# Patient Record
Sex: MALE | Race: WHITE
[De-identification: names, ages, dates, MRNs, and addresses within clinical notes are randomized per-mention and may not be internally consistent; named-entity substitution may affect disease eponyms.]

---

## 2017-03-20 ENCOUNTER — HOSPITAL ENCOUNTER (OUTPATIENT)
Dept: HOSPITAL 8 - CVU | Age: 72
Discharge: HOME | End: 2017-03-20
Attending: INTERNAL MEDICINE
Payer: MEDICARE

## 2017-03-20 DIAGNOSIS — I34.0: Primary | ICD-10-CM

## 2017-03-20 DIAGNOSIS — I44.7: ICD-10-CM

## 2017-03-20 DIAGNOSIS — I51.7: ICD-10-CM

## 2017-03-20 DIAGNOSIS — I42.0: ICD-10-CM

## 2017-03-20 PROCEDURE — 93306 TTE W/DOPPLER COMPLETE: CPT

## 2018-08-18 ENCOUNTER — APPOINTMENT (OUTPATIENT)
Dept: RADIOLOGY | Facility: MEDICAL CENTER | Age: 73
DRG: 510 | End: 2018-08-18
Attending: ORTHOPAEDIC SURGERY
Payer: MEDICARE

## 2018-08-18 ENCOUNTER — APPOINTMENT (OUTPATIENT)
Dept: RADIOLOGY | Facility: MEDICAL CENTER | Age: 73
DRG: 510 | End: 2018-08-18
Attending: EMERGENCY MEDICINE
Payer: MEDICARE

## 2018-08-18 ENCOUNTER — APPOINTMENT (OUTPATIENT)
Dept: RADIOLOGY | Facility: MEDICAL CENTER | Age: 73
DRG: 510 | End: 2018-08-18
Attending: PHYSICIAN ASSISTANT
Payer: MEDICARE

## 2018-08-18 ENCOUNTER — HOSPITAL ENCOUNTER (INPATIENT)
Facility: MEDICAL CENTER | Age: 73
LOS: 4 days | DRG: 510 | End: 2018-08-22
Attending: EMERGENCY MEDICINE | Admitting: SURGERY
Payer: MEDICARE

## 2018-08-18 ENCOUNTER — RESOLUTE PROFESSIONAL BILLING HOSPITAL PROF FEE (OUTPATIENT)
Dept: HOSPITALIST | Facility: MEDICAL CENTER | Age: 73
End: 2018-08-18
Payer: MEDICARE

## 2018-08-18 ENCOUNTER — HOSPITAL ENCOUNTER (OUTPATIENT)
Dept: RADIOLOGY | Facility: MEDICAL CENTER | Age: 73
End: 2018-08-18

## 2018-08-18 DIAGNOSIS — S12.9XXA COMPRESSION FRACTURE OF CERVICAL SPINE, INITIAL ENCOUNTER: ICD-10-CM

## 2018-08-18 DIAGNOSIS — S22.000A CLOSED COMPRESSION FRACTURE OF THORACIC VERTEBRA, INITIAL ENCOUNTER (HCC): ICD-10-CM

## 2018-08-18 DIAGNOSIS — S62.102A CLOSED FRACTURE OF LEFT WRIST, INITIAL ENCOUNTER: ICD-10-CM

## 2018-08-18 DIAGNOSIS — S09.90XA CLOSED HEAD INJURY, INITIAL ENCOUNTER: ICD-10-CM

## 2018-08-18 PROBLEM — T07.XXXA MULTIPLE TRAUMA: Status: ACTIVE | Noted: 2018-08-18

## 2018-08-18 PROBLEM — N40.0 BPH (BENIGN PROSTATIC HYPERPLASIA): Status: ACTIVE | Noted: 2018-08-18

## 2018-08-18 PROBLEM — S52.90XA RADIAL FRACTURE: Status: ACTIVE | Noted: 2018-08-18

## 2018-08-18 PROBLEM — I77.74 DISSECTION, VERTEBRAL ARTERY (HCC): Status: ACTIVE | Noted: 2018-08-18

## 2018-08-18 PROBLEM — S22.49XA MULTIPLE RIB FRACTURES: Status: ACTIVE | Noted: 2018-08-18

## 2018-08-18 PROBLEM — E11.9 TYPE 2 DIABETES MELLITUS (HCC): Status: ACTIVE | Noted: 2018-08-18

## 2018-08-18 PROBLEM — I10 ESSENTIAL HYPERTENSION: Status: ACTIVE | Noted: 2018-08-18

## 2018-08-18 LAB
ABO GROUP BLD: NORMAL
ABO GROUP BLD: NORMAL
ALBUMIN SERPL BCP-MCNC: 4.1 G/DL (ref 3.2–4.9)
ALBUMIN/GLOB SERPL: 1.5 G/DL
ALP SERPL-CCNC: 47 U/L (ref 30–99)
ALT SERPL-CCNC: 25 U/L (ref 2–50)
ANION GAP SERPL CALC-SCNC: 8 MMOL/L (ref 0–11.9)
APTT PPP: 32.6 SEC (ref 24.7–36)
AST SERPL-CCNC: 34 U/L (ref 12–45)
BILIRUB SERPL-MCNC: 0.9 MG/DL (ref 0.1–1.5)
BLD GP AB SCN SERPL QL: NORMAL
BUN SERPL-MCNC: 16 MG/DL (ref 8–22)
CALCIUM SERPL-MCNC: 9.1 MG/DL (ref 8.5–10.5)
CFT BLD TEG: 4.3 MIN (ref 5–10)
CHLORIDE SERPL-SCNC: 105 MMOL/L (ref 96–112)
CLOT ANGLE BLD TEG: 62.7 DEGREES (ref 53–72)
CLOT LYSIS 30M P MA LENFR BLD TEG: 0 % (ref 0–8)
CO2 SERPL-SCNC: 25 MMOL/L (ref 20–33)
CREAT SERPL-MCNC: 1.12 MG/DL (ref 0.5–1.4)
CT.EXTRINSIC BLD ROTEM: 2.2 MIN (ref 1–3)
ERYTHROCYTE [DISTWIDTH] IN BLOOD BY AUTOMATED COUNT: 43.5 FL (ref 35.9–50)
ETHANOL BLD-MCNC: 0.01 G/DL
GLOBULIN SER CALC-MCNC: 2.7 G/DL (ref 1.9–3.5)
GLUCOSE SERPL-MCNC: 125 MG/DL (ref 65–99)
HCT VFR BLD AUTO: 40 % (ref 42–52)
HGB BLD-MCNC: 13.6 G/DL (ref 14–18)
INR PPP: 1.11 (ref 0.87–1.13)
MCF BLD TEG: 65.6 MM (ref 50–70)
MCH RBC QN AUTO: 29.4 PG (ref 27–33)
MCHC RBC AUTO-ENTMCNC: 34 G/DL (ref 33.7–35.3)
MCV RBC AUTO: 86.6 FL (ref 81.4–97.8)
PA AA BLD-ACNC: 91.4 %
PA ADP BLD-ACNC: 89.9 %
PLATELET # BLD AUTO: 222 K/UL (ref 164–446)
PMV BLD AUTO: 12.2 FL (ref 9–12.9)
POTASSIUM SERPL-SCNC: 4.1 MMOL/L (ref 3.6–5.5)
PROT SERPL-MCNC: 6.8 G/DL (ref 6–8.2)
PROTHROMBIN TIME: 14 SEC (ref 12–14.6)
RBC # BLD AUTO: 4.62 M/UL (ref 4.7–6.1)
RH BLD: NORMAL
RH BLD: NORMAL
SODIUM SERPL-SCNC: 138 MMOL/L (ref 135–145)
TEG ALGORITHM TGALG: ABNORMAL
WBC # BLD AUTO: 18.9 K/UL (ref 4.8–10.8)

## 2018-08-18 PROCEDURE — 86850 RBC ANTIBODY SCREEN: CPT

## 2018-08-18 PROCEDURE — 85384 FIBRINOGEN ACTIVITY: CPT

## 2018-08-18 PROCEDURE — 70498 CT ANGIOGRAPHY NECK: CPT

## 2018-08-18 PROCEDURE — 70450 CT HEAD/BRAIN W/O DYE: CPT

## 2018-08-18 PROCEDURE — 86900 BLOOD TYPING SEROLOGIC ABO: CPT

## 2018-08-18 PROCEDURE — 700117 HCHG RX CONTRAST REV CODE 255: Performed by: EMERGENCY MEDICINE

## 2018-08-18 PROCEDURE — 73100 X-RAY EXAM OF WRIST: CPT | Mod: LT

## 2018-08-18 PROCEDURE — 700111 HCHG RX REV CODE 636 W/ 250 OVERRIDE (IP): Performed by: NURSE PRACTITIONER

## 2018-08-18 PROCEDURE — C1713 ANCHOR/SCREW BN/BN,TIS/BN: HCPCS | Performed by: ORTHOPAEDIC SURGERY

## 2018-08-18 PROCEDURE — 160039 HCHG SURGERY MINUTES - EA ADDL 1 MIN LEVEL 3: Performed by: ORTHOPAEDIC SURGERY

## 2018-08-18 PROCEDURE — 99291 CRITICAL CARE FIRST HOUR: CPT

## 2018-08-18 PROCEDURE — A6222 GAUZE <=16 IN NO W/SAL W/O B: HCPCS | Performed by: ORTHOPAEDIC SURGERY

## 2018-08-18 PROCEDURE — 80307 DRUG TEST PRSMV CHEM ANLYZR: CPT

## 2018-08-18 PROCEDURE — 700111 HCHG RX REV CODE 636 W/ 250 OVERRIDE (IP)

## 2018-08-18 PROCEDURE — G0390 TRAUMA RESPONS W/HOSP CRITI: HCPCS

## 2018-08-18 PROCEDURE — 770022 HCHG ROOM/CARE - ICU (200)

## 2018-08-18 PROCEDURE — 500881 HCHG PACK, EXTREMITY: Performed by: ORTHOPAEDIC SURGERY

## 2018-08-18 PROCEDURE — 160002 HCHG RECOVERY MINUTES (STAT): Performed by: ORTHOPAEDIC SURGERY

## 2018-08-18 PROCEDURE — 85576 BLOOD PLATELET AGGREGATION: CPT

## 2018-08-18 PROCEDURE — 72131 CT LUMBAR SPINE W/O DYE: CPT

## 2018-08-18 PROCEDURE — 85610 PROTHROMBIN TIME: CPT

## 2018-08-18 PROCEDURE — 700105 HCHG RX REV CODE 258: Performed by: PHYSICIAN ASSISTANT

## 2018-08-18 PROCEDURE — 501838 HCHG SUTURE GENERAL: Performed by: ORTHOPAEDIC SURGERY

## 2018-08-18 PROCEDURE — 85347 COAGULATION TIME ACTIVATED: CPT

## 2018-08-18 PROCEDURE — 700111 HCHG RX REV CODE 636 W/ 250 OVERRIDE (IP): Performed by: ORTHOPAEDIC SURGERY

## 2018-08-18 PROCEDURE — 160028 HCHG SURGERY MINUTES - 1ST 30 MINS LEVEL 3: Performed by: ORTHOPAEDIC SURGERY

## 2018-08-18 PROCEDURE — 86901 BLOOD TYPING SEROLOGIC RH(D): CPT

## 2018-08-18 PROCEDURE — 73090 X-RAY EXAM OF FOREARM: CPT | Mod: LT

## 2018-08-18 PROCEDURE — 700101 HCHG RX REV CODE 250: Mod: JW

## 2018-08-18 PROCEDURE — 160009 HCHG ANES TIME/MIN: Performed by: ORTHOPAEDIC SURGERY

## 2018-08-18 PROCEDURE — 94668 MNPJ CHEST WALL SBSQ: CPT

## 2018-08-18 PROCEDURE — 85027 COMPLETE CBC AUTOMATED: CPT

## 2018-08-18 PROCEDURE — 160048 HCHG OR STATISTICAL LEVEL 1-5: Performed by: ORTHOPAEDIC SURGERY

## 2018-08-18 PROCEDURE — 72125 CT NECK SPINE W/O DYE: CPT

## 2018-08-18 PROCEDURE — 160035 HCHG PACU - 1ST 60 MINS PHASE I: Performed by: ORTHOPAEDIC SURGERY

## 2018-08-18 PROCEDURE — 85730 THROMBOPLASTIN TIME PARTIAL: CPT

## 2018-08-18 PROCEDURE — 0PSJ04Z REPOSITION LEFT RADIUS WITH INTERNAL FIXATION DEVICE, OPEN APPROACH: ICD-10-PCS | Performed by: ORTHOPAEDIC SURGERY

## 2018-08-18 PROCEDURE — 72128 CT CHEST SPINE W/O DYE: CPT

## 2018-08-18 PROCEDURE — 94667 MNPJ CHEST WALL 1ST: CPT

## 2018-08-18 PROCEDURE — 71260 CT THORAX DX C+: CPT

## 2018-08-18 PROCEDURE — 80053 COMPREHEN METABOLIC PANEL: CPT

## 2018-08-18 PROCEDURE — 700105 HCHG RX REV CODE 258: Performed by: NURSE PRACTITIONER

## 2018-08-18 DEVICE — SCREW 2.4MM LCP VA 16MM - (2VADRX5=10): Type: IMPLANTABLE DEVICE | Site: WRIST | Status: FUNCTIONAL

## 2018-08-18 DEVICE — SCREW CRTX 2.4X14MM ST T8 - (3TX3+1TX2=11) (CYC=3): Type: IMPLANTABLE DEVICE | Site: WRIST | Status: FUNCTIONAL

## 2018-08-18 DEVICE — PLATE 2.4 LCP VA VDR 6X3H LT - (2VADR=2) 2-CLMN: Type: IMPLANTABLE DEVICE | Site: WRIST | Status: FUNCTIONAL

## 2018-08-18 DEVICE — SCREW 2.4MM LCP VA 18MM - (2VADRX5=10): Type: IMPLANTABLE DEVICE | Site: WRIST | Status: FUNCTIONAL

## 2018-08-18 RX ORDER — SODIUM CHLORIDE 9 MG/ML
INJECTION, SOLUTION INTRAVENOUS
Status: COMPLETED | OUTPATIENT
Start: 2018-08-18 | End: 2018-08-18

## 2018-08-18 RX ORDER — DOCUSATE SODIUM 100 MG/1
100 CAPSULE, LIQUID FILLED ORAL 2 TIMES DAILY
Status: DISCONTINUED | OUTPATIENT
Start: 2018-08-18 | End: 2018-08-22 | Stop reason: HOSPADM

## 2018-08-18 RX ORDER — POLYETHYLENE GLYCOL 3350 17 G/17G
1 POWDER, FOR SOLUTION ORAL 2 TIMES DAILY
Status: DISCONTINUED | OUTPATIENT
Start: 2018-08-18 | End: 2018-08-22 | Stop reason: HOSPADM

## 2018-08-18 RX ORDER — BISACODYL 10 MG
10 SUPPOSITORY, RECTAL RECTAL
Status: DISCONTINUED | OUTPATIENT
Start: 2018-08-18 | End: 2018-08-22 | Stop reason: HOSPADM

## 2018-08-18 RX ORDER — ONDANSETRON 2 MG/ML
4 INJECTION INTRAMUSCULAR; INTRAVENOUS EVERY 4 HOURS PRN
Status: DISCONTINUED | OUTPATIENT
Start: 2018-08-18 | End: 2018-08-22 | Stop reason: HOSPADM

## 2018-08-18 RX ORDER — SODIUM CHLORIDE 9 MG/ML
INJECTION, SOLUTION INTRAVENOUS CONTINUOUS
Status: DISCONTINUED | OUTPATIENT
Start: 2018-08-18 | End: 2018-08-19

## 2018-08-18 RX ORDER — CEFAZOLIN SODIUM 2 G/100ML
2 INJECTION, SOLUTION INTRAVENOUS EVERY 8 HOURS
Status: COMPLETED | OUTPATIENT
Start: 2018-08-18 | End: 2018-08-19

## 2018-08-18 RX ORDER — ENEMA 19; 7 G/133ML; G/133ML
1 ENEMA RECTAL
Status: DISCONTINUED | OUTPATIENT
Start: 2018-08-18 | End: 2018-08-22 | Stop reason: HOSPADM

## 2018-08-18 RX ORDER — MORPHINE SULFATE 4 MG/ML
4 INJECTION, SOLUTION INTRAMUSCULAR; INTRAVENOUS
Status: DISCONTINUED | OUTPATIENT
Start: 2018-08-18 | End: 2018-08-20

## 2018-08-18 RX ORDER — AMOXICILLIN 250 MG
1 CAPSULE ORAL
Status: DISCONTINUED | OUTPATIENT
Start: 2018-08-18 | End: 2018-08-22 | Stop reason: HOSPADM

## 2018-08-18 RX ORDER — AMOXICILLIN 250 MG
1 CAPSULE ORAL NIGHTLY
Status: DISCONTINUED | OUTPATIENT
Start: 2018-08-18 | End: 2018-08-22 | Stop reason: HOSPADM

## 2018-08-18 RX ORDER — FAMOTIDINE 20 MG/1
20 TABLET, FILM COATED ORAL 2 TIMES DAILY
Status: DISCONTINUED | OUTPATIENT
Start: 2018-08-18 | End: 2018-08-18

## 2018-08-18 RX ADMIN — SODIUM CHLORIDE: 9 INJECTION, SOLUTION INTRAVENOUS at 10:33

## 2018-08-18 RX ADMIN — IOHEXOL 100 ML: 350 INJECTION, SOLUTION INTRAVENOUS at 10:22

## 2018-08-18 RX ADMIN — SODIUM CHLORIDE 100 ML/HR: 9 INJECTION, SOLUTION INTRAVENOUS at 09:47

## 2018-08-18 RX ADMIN — MORPHINE SULFATE 2 MG: 4 INJECTION INTRAVENOUS at 20:22

## 2018-08-18 RX ADMIN — CEFAZOLIN SODIUM 2 G: 2 INJECTION, SOLUTION INTRAVENOUS at 20:16

## 2018-08-18 ASSESSMENT — PAIN SCALES - GENERAL
PAINLEVEL_OUTOF10: 0
PAINLEVEL_OUTOF10: 4
PAINLEVEL_OUTOF10: 2
PAINLEVEL_OUTOF10: 0
PAINLEVEL_OUTOF10: 2
PAINLEVEL_OUTOF10: 0

## 2018-08-18 ASSESSMENT — COPD QUESTIONNAIRES
DURING THE PAST 4 WEEKS HOW MUCH DID YOU FEEL SHORT OF BREATH: NONE/LITTLE OF THE TIME
HAVE YOU SMOKED AT LEAST 100 CIGARETTES IN YOUR ENTIRE LIFE: YES
COPD SCREENING SCORE: 5
DO YOU EVER COUGH UP ANY MUCUS OR PHLEGM?: YES, A FEW DAYS A WEEK OR MONTH

## 2018-08-18 ASSESSMENT — LIFESTYLE VARIABLES: EVER_SMOKED: YES

## 2018-08-18 NOTE — OP REPORT
DATE OF SERVICE:  08/18/2018    PREOPERATIVE DIAGNOSIS:  Left displaced distal radial metaphyseal fracture.    POSTOPERATIVE DIAGNOSIS:  Left displaced distal radial metaphyseal fracture.    PROCEDURE PERFORMED:  Open treatment with internal fixation of left distal   radius fracture.    SURGEON:  Steven Mireles MD    ANESTHESIOLOGIST:  Curry Baer MD    ANESTHESIA:  General and regional.    ESTIMATED BLOOD LOSS:  Minimal.    TOURNIQUET TIME:  34 minutes at 250 mmHg, left arm.    IMPLANTS:  Synthes volar distal radius locking plate with combination of   locking and nonlocking 2.4 mm screws.    INDICATION FOR PROCEDURE:  The patient is a 72-year-old male who fell off a   haystack, he sustained multiple cervical spine fractures and was evaluated by   Dr. Pagan for this injury and has been immobilized in a rigid cervical collar.    He also had a left displaced angulated comminuted distal radial metaphyseal   fracture and I discussed with him treatment options.  Given his poly traumatic   injuries and the fracture pattern, I felt that surgical reduction and   fixation was a reasonable to help facilitate healing in good alignment and the   ability to utilize his upper extremity sooner rather than later to help with   his rehabilitation.  We discussed risks, benefits and alternatives of surgical   management and he wished to proceed with open reduction and internal fixation   after discussing options for closed reduction and splinting versus closed   reduction and percutaneous pin fixation.    DESCRIPTION OF PROCEDURE:  Patient was met in the preoperative holding area.    His surgical site was signed.  His consent was confirmed to be accurate.  He   was taken back to the operating room and general anesthesia was induced.    Ancef was administered.  Tourniquet was applied to left arm.  We had   maintained spinal precautions upon transferring him over to the table.  His   cervical collar was in place throughout  the entire procedure.  The left upper   extremity was then prepped and draped in the usual sterile fashion.  A formal   timeout was performed to confirm patient's correct name, correct surgical   site, correct procedure and correct laterality.  The limb was then   exsanguinated with an Esmarch and the tourniquet was inflated to 250 mmHg.  A   longitudinal incision was made centered over the FCR tendon sheath with a   scalpel down through skin.  Dissection was carried through tendon sheath with   a 15 blade scalpel.  Blunt dissection was performed down to pronator   quadratus, which was released off the radial and distal aspects of the radius.    The fracture was reduced and stabilized with a 1.25 mm K-wire and then   positioned a 3-hole Synthes volar distal radius locking plate to appropriate   position and pinned it into place confirmed with acceptably aligned on AP and   lateral fluoroscopic imaging and fixed it to the shaft with bicortical   nonlocking screw into the distal aspect with unicortical nonlocking screw.  I   then placed several locking screws in the distal row and another locking screw   in the proximal plate.  Final fluoroscopic imaging confirmed overall   acceptable alignment of the fracture and acceptable position of the implants.    The wound was thoroughly irrigated with normal saline.  I repaired the   portion of the pronator quadratus to the brachioradialis tendon to cover the   plate with 2-0 Vicryl, subQ layers with 2-0 Vicryl and skin edges with running   3-0 nylon.  I then applied a sterile compressive dressing, was placed into a   well-padded volar plaster splint.  The tourniquet deflated after 34 minutes.    He was awoken from anesthesia and transferred on the [place] and taken to   postanesthesia care unit in stable condition.    PLAN:  1.  Patient will be readmitted to trauma surgery service postop.  2.  He should be nonweightbearing to the left hand, but can weightbear through   the  left forearm as tolerated.  3.  He will need Ancef 2 doses of postop for routine infection prophylaxis.  4.  I will defer his ability to ambulate with physical and occupational   therapy to Dr. Pagan who is following him for his surgical spine injury.       ____________________________________     MD TANGELA Elliott / RICKY    DD:  08/18/2018 14:49:57  DT:  08/18/2018 15:13:13    D#:  [id number]  Job#:  [id number]

## 2018-08-18 NOTE — ED PROVIDER NOTES
"ED Provider Note    CHIEF COMPLAINT  No chief complaint on file.      HPI  Nylon Eighteen is a 72-year-old male who fell off of a stack of headache, possibly 20 feet onto his head. No loss of consciousness, able to ambulate after the injury. Complains of left headache neck pain left wrist pain. No chest pain no abdominal pain or upper back pain the low back pain or extremity pain except for left wrist. He was seen at another hospital where multiple fractures of the C-spine were diagnosed and transferred here for that reason.    REVIEW OF SYSTEMS  See HPI for further details.Denies other G.I., G.U.. endrocine, cardiovascular, respriatory or neurological problems.  All other systems are negative.     PAST MEDICAL HISTORY  No past medical history on file.    FAMILY HISTORY  No family history on file.    SOCIAL HISTORY  Social History     Social History   • Marital status: N/A     Spouse name: N/A   • Number of children: N/A   • Years of education: N/A     Social History Main Topics   • Smoking status: Not on file   • Smokeless tobacco: Not on file   • Alcohol use Not on file   • Drug use: Unknown   • Sexual activity: Not on file     Other Topics Concern   • Not on file     Social History Narrative   • No narrative on file       SURGICAL HISTORY  No past surgical history on file.    CURRENT MEDICATIONS  Home Medications    **Home medications have not yet been reviewed for this encounter**         ALLERGIES  Allergies not on file    PHYSICAL EXAM  VITAL SIGNS: /82   Pulse 57   Temp 35.7 °C (96.2 °F)   Resp 16   Ht 1.905 m (6' 3\")   Wt 113.4 kg (250 lb)   SpO2 100% Comment: 2 L NC  BMI 31.25 kg/m²   Constitutional: Well developed, Well nourished, No acute distress, Non-toxic appearance.   HENT: Normocephalic, Atraumatic, Bilateral external ears normal, Oropharynx moist, No oral exudates, Nose normal.   Eyes: PERRL, EOMI, Conjunctiva normal, No discharge.   Neck: Neck is in a c-collar, tender " posteriorly  Lymphatic: No lymphadenopathy noted.   Cardiovascular: Normal heart rate, Normal rhythm, No murmurs, No rubs, No gallops.   Thorax & Lungs: Normal breath sounds, No respiratory distress, No wheezing, No chest tenderness.   Abdomen:  No tenderness, no guarding no rigidity and the abdomen is soft.  No masses, No pulsatile masses.  Skin: Warm, Dry, No erythema, No rash.   Back: No tenderness, No CVA tenderness.   Extremities: Intact distal pulses, left wrist is in a splint,, No cyanosis, No clubbing.   Musculoskeletal: Good range of motion in all major joints. No tenderness to palpation or major deformities noted.   Neurologic: Alert & oriented x 3, Normal motor function, Normal sensory function, No focal deficits noted.   Psychiatric: Affect normal, Judgment normal, Mood normal.       RADIOLOGY/PROCEDURES      COURSE & MEDICAL DECISION MAKING  Pertinent Labs & Imaging studies reviewed. (See chart for details)    He fell from stack a headache, seen at another hospital, multiple C-spine fractures including in the area of the left vertebral artery. CAT scan CTA done.  FINAL IMPRESSION  1.   1. Compression fracture of cervical spine, initial encounter (Former[facility])    2. Closed fracture of left wrist, initial encounter    3. Closed head injury, initial encounter            2.   3.     Disposition  Patient is admitted to trauma service, CTA pending orthopedics will be called in addition.  Electronically signed by: Srinivasan Eisenberg, 8/18/2018 9:34 AM

## 2018-08-18 NOTE — PROGRESS NOTES
Pt arrived to S[facility] with PACU RN, pt attached to ICU monitor, report received, 2 RN skin assessment completed, no areas of concern.

## 2018-08-18 NOTE — CONSULTS
DATE OF SERVICE:  08/18/2018    ORTHOPEDIC CONSULTATION    REQUESTING PHYSICIAN:  Dr. Mooney, trauma surgery.    REASON FOR CONSULTATION:  Left distal radius fracture.    CHIEF COMPLAINT:  Left wrist pain and neck pain.    HISTORY OF PRESENT ILLNESS:  Patient is a 72-year-old male.  He fell off a   haystack today and sustained injuries to his cervical spine and his left   distal radius.  He was seen in the emergency department as a trauma patient   and admitted to trauma surgical ICU and Dr. Pagan is evaluating him for   cervical spine injury.  He denies any numbness in the left upper extremity.    He has had a splint placed in the trauma bay.  He denies injuries to his right   arm or bilateral lower extremities.    PAST MEDICAL HISTORY:  ALLERGIES:  No known drug allergies.    MEDICATIONS:  None.    PAST MEDICAL DIAGNOSIS:  None.    PAST SURGICAL HISTORY:  Knee surgery.    SOCIAL HISTORY:  He is a nonsmoker.    REVIEW OF SYSTEMS:  He denies fevers, chills, nausea, vomiting, shortness of   breath, chest pain; otherwise, normal per AMA criteria other than that are   stated in the HPI.    PHYSICAL EXAMINATION:  VITAL SIGNS:  His temperature is 97.4, heart rate 61, respiratory rate 16,   blood pressure 143/71, and pulse oximetry 93% on room air.  GENERAL APPEARANCE:  Patient is alert.  He is oriented.  He is in no acute   distress.  HEAD, EYES, EARS, NOSE, AND THROAT:  He has a cervical collar in place.  His   mucous membranes are moist.  He has nonicteric sclerae.  PULMONARY:  Symmetric, unlabored breathing.  CARDIOVASCULAR:  Extremities well perfused.  Regular rate and rhythm seen on   the monitor.  ABDOMEN:  Thin, nondistended.  MUSCULOSKELETAL:  Left upper extremity has long arm splint in place.  He is   able to flex and extend all his fingers.  He has sensation intact to light   touch in all his fingers including the thumb.  He is nontender to palpation at   the arm proximally with splint and shoulder.  Right upper  and bilateral lower   extremities are grossly neurovascularly intact.  He has no pain with logroll   to bilateral lower extremities and is neurovascularly intact and has no pain   with active ankle dorsi and plantarflexion.    RADIOGRAPHIC DATA:  Plain x-rays of the right forearm shows a displaced distal   radial metaphyseal fracture with apex volar angulation.    ASSESSMENT:  A 72-year-old male with multiple cervical spine fractures and a   left displaced distal radial metaphyseal fracture.  Dr. Pagan has evaluated him   from a neurosurgical standpoint and recommends cervical spine immobilization   in a rigid cervical collar and feels that it is okay to proceed with   anesthesia as long as his cervical collar remains in place according to his   note.    PLAN:  1.  I discussed treatment options with the patient.  We discussed options for   closed reduction versus closed reduction and pinning versus open reduction and   internal fixation.  We discussed the potential pros and cons of each   treatment option as well as risks of surgery including infection,   neurovascular injury, and general risk of anesthesia.  I feel that given the   fracture pattern comminution with some proximal extension that likely the best   most definitive surgical treatment option would be a surgical management with   open reduction and internal fixation.  I also feel that given his other   injuries, this may help his recovery that he can utilize his left wrist sooner   for rehabilitation.  Patient is in agreement and wishes to proceed with   surgical reduction and fixation and is felt to be medically optimized for   surgical management.  2.  Patient is currently n.p.o. and we will make preparations to take him to   the operating room this afternoon for surgical fixation of his left distal   radius fracture.       ____________________________________     MD TANGELA Elliott / RICKY    DD:  08/18/2018 13:04:01  DT:  08/18/2018  13:40:07    D#:  [id number]  Job#:  [id number]

## 2018-08-18 NOTE — CONSULTS
DATE OF SERVICE:  08/18/2018    REASON FOR CONSULTATION:  Cervical fractures post-trauma and right vertebral   artery dissection and thoracic fractures.    CLINICAL HISTORY:  The patient is a 72-year-old male who fell off a stack of   hay approximately 20 feet onto his head.  The patient denies loss of   consciousness.  The patient was able to ambulate after the injury.  The   patient was transferred from [facility] for cervical spine fractures.    The patient denies any tingling, numbness, or weakness.  The patient complains   of neck pain.  The patient complains of left arm pain.  The patient also   sustained a left radius fracture.  The CAT scan of the cervical spine shows C1   posterior ring fracture without displacement.  Bilateral C2 lateral mass   fractures without displacement.  A left C5 laminar fracture extending into the   left C5-C6 facet joint.  Bilateral C6 pedicle fractures with probable   extension in the right C6-C7 facet joint.  Subluxation at C6-C7 and occlusion   of the right vertebral artery with reconstitution at the level of C1-C2.  The   CAT scan of the thoracic spine shows a T4 superior endplate fracture and   probable mild superior endplate compression fractures of T3 and T6.    PHYSICAL EXAMINATION:  GENERAL:  The patient, upon examination, was found to be awake, alert and   oriented x3.  NEUROLOGIC:  Cranial nerves II-XII are grossly intact.  Motor strength appears   full on the right arm and the legs.  The patient is able to wiggle his   fingers and the patient denies any tingling or numbness in any of his   extremities.  The patient denies tingling or numbness in his face.  The   patient had no evidence of long track signs.  The patient had no sensory   level.  The patient's cerebellar exam showed no evidence of appendicular   tremor in the right arm or legs.  The patient was examined in bed.  The gait   was not tested.    IMAGING:  Reviewed personally.    IMPRESSION AND PLAN:   Closed head injury with a normal head CT and C1, C2, C5,   C6, and C7 fractures, which appear to be stable with no evidence of any   grossly unstable alignment with a superior endplate fracture of T4 and mild   superior endplate compression fractures of T3 and T6 with a right occluded   vertebral artery with reconstitution cephalad.  The patient needs a cervical   thoracic orthosis for 2-3 months.  I do not feel that he will need a direct   surgical internal fixation for his cervical and thoracic fractures.  These   should heal in a brace.  I have recommended anticoagulation.  I have discussed   this directly with the daughter and the wife and the patient and nurse   practitioner, Zulema ____ who is working with Dr. Mooney.  There is no   indication for neurosurgical intervention at this point.  We will follow   peripherally.  Please let me know if I can be of further assistance.       ____________________________________     MD FAUSTINA PAUL / RICKY    DD:  08/18/2018 12:05:31  DT:  08/18/2018 12:24:29    D#:  [id number]  Job#:  [id number]

## 2018-08-18 NOTE — H&P
Trauma History and Physical  8/18/2018    Attending Physician: Dr. Mooney     CC: Trauma The patient was triaged as a Trauma Yellow in accordance with established pre hospital protols. An expeditious primary and secondary survey with required adjuncts was conducted. See Trauma Narrator for full details.    HPI: This is a 60 male.  He did not lose consciousness.  He fell from 25' off of [employer].  Taken to [facility] where diagnositics of left upper extremity and CT C-spine were completed.  Distal radius fracture identified and left upper extremity was splinted.  C1,2,5 and 6 fractures were identified, C-collar placed.  Pt was transported by [facility] to [facility].     No past medical history on file.     is required. Please contact your  to configure this SmartLink.    Current Facility-Administered Medications   Medication Dose Route Frequency Provider Last Rate Last Dose   • NS infusion   Intravenous ED CONTINUOUS Tony Manuel PJulio CesarADEBRA. 100 mL/hr at 08/18/18 0947 100 mL/hr at 08/18/18 0947     No current outpatient prescriptions on file.       Social History     Social History   • Marital status:      Spouse name: N/A   • Number of children: N/A   • Years of education: N/A     Occupational History   • Not on file.     Social History Main Topics   • Smoking status: Not on file   • Smokeless tobacco: Not on file   • Alcohol use Not on file   • Drug use: Unknown   • Sexual activity: Not on file     Other Topics Concern   • Not on file     Social History Narrative   • No narrative on file       No family history on file.    Allergies:  Seasonal    Review of Systems:  Constitutional: Negative for fever, chills, weight loss, malaise/fatigue and diaphoresis.   HENT: Negative for hearing loss, ear pain, nosebleeds, congestion, sore throat, neck pain, and ear discharge.    Eyes: Negative for blurred vision, double vision, and redness, uses glasses for reading.   Respiratory: Negative for  "cough, sputum production, shortness of breath, wheezing and stridor.    Cardiovascular: Negative for chest pain, palpitations.   Gastrointestinal: Negative for heartburn, nausea, vomiting, abdominal pain, diarrhea, constipation.  Genitourinary: Negative for dysuria, urgency, frequency.   Musculoskeletal: Negative for myalgias, back pain, joint pain left upper extremity.   Skin: Negative for itching and rash.  Neurological: Negative for dizziness, loss of consciousness, weakness and headaches.   Endo/Heme/Allergies: Negative for environmental allergies. Does not bruise/bleed easily.   Psychiatric/Behavioral: Negative for depression and substance abuse. The patient is not nervous/anxious.    Physical Exam:  Blood pressure 148/74, pulse 65, temperature 35.7 °C (96.2 °F), resp. rate 16, height 1.905 m (6' 3\"), weight 113.4 kg (250 lb), SpO2 100 %.    Constitutional: Awake, alert, oriented x3. No acute distress. GCS 15. E4 V5 M6.  Head: No cephalohematoma. Pupils 4-3 reactive bilaterally. Midface stable. No malocclusion.  TMs clear bilaterally.   Neck: No tracheal deviation. Midline cervical spine tenderness. C-collar in place.  Cardiovascular: Normal rate, regular rhythm, normal heart sounds and intact distal pulses.  Exam reveals no gallop and no friction rub.  No murmur heard.  Pulmonary/Chest: Clavicles nontender to palpation. There is any chest wall tenderness bilaterally.  No crepitus. Positive breath sounds bilaterally.   Abdominal: Soft, nondistended. Nontender to palpation. Obese.  Pelvis is stable to anterior-posterior compression.  Musculoskeletal: Right upper extremity grossly atraumatic, palpable radial pulse. 5/5  strength. Full ROM and strength at elbow.  Left upper extremity Full spline in place, able to wiggle fingers with positive sensation.    Right lower extremity grossly atraumatic. 5/5 strength in ankle plantar flexion and dorsiflexion. No pain and full ROM at right knee and hip.   Left  lower " "extremity grossly atraumatic. 5/5 strength in ankle plantar flexion and dorsiflexion. No pain and full ROM at left knee and hip.   Back: Midline thoracic and lumbar spines are nontender to palpation.      Skin: Skin is warm and dry.  No diaphoresis. No erythema. No pallor.   Psychiatric:  Normal mood and affect.  Behavior is appropriate.       Labs:  Recent Labs      08/18/18 0927   WBC  18.9*   RBC  4.62*   HEMOGLOBIN  13.6*   HEMATOCRIT  40.0*   MCV  86.6   MCH  29.4   MCHC  34.0   RDW  43.5   PLATELETCT  222   MPV  12.2         Recent Labs      08/18/18 0927   APTT  32.6   INR  1.11     Recent Labs      08/18/18 0927   INR  1.11       Radiology:  DX-FOREARM LEFT   Final Result      1.  Comminuted, intra-articular left distal radial fracture with dorsal angulation      2.  No other significant finding      OUTSIDE IMAGES-DX UPPER EXTREMITY, LEFT   Final Result      OUTSIDE IMAGES-CT CERVICAL SPINE   Final Result      CT-CSPINE WITHOUT PLUS RECONS    (Results Pending)   CT-TSPINE W/O PLUS RECONS    (Results Pending)   CT-CHEST,ABDOMEN,PELVIS WITH    (Results Pending)   CT-HEAD W/O    (Results Pending)   CT-LSPINE W/O PLUS RECONS    (Results Pending)   CT-CTA NECK WITH & W/O-POST PROCESSING    (Results Pending)         Assessment: This is a 72 y.o.     Plan: SICU, Neurosurgery consult, Vascular consult pending.  Active Hospital Problems    Diagnosis   • Closed fracture of cervical vertebra (HCC) [S12.9XXA]     Priority: High     CTA pending   Outside images Bilateral posterior Arch C1 fracture  Left C2 lateral mass fracture  Left C5 lamina fracture into spinous process  Bilateral C6 pedicle fractures       • BPH (benign prostatic hyperplasia) [N40.0]     Priority: High     Resume home medications when appropriate.  Terazosin  Dutasteride     • Multiple trauma [T07.XXXA]     Priority: Low     Fall from 25\" Hay stack  Trauma yellow  Dr. Mooney, trauma      • Essential hypertension [I10]     Priority: Low     " Resume home medications when appropriate.  Ramipril 2.5 mg daily.  Carvedilol         • Type 2 diabetes mellitus (HCC) [E11.9]     Priority: Low     No home medications   Glucose monitoring.            Time spent: 30

## 2018-08-18 NOTE — OR SURGEON
Immediate Post OP Note    PreOp Diagnosis: Left displaced distal radius fracture    PostOp Diagnosis: same    Procedure(s):  WRIST ORIF POSS - Wound Class: Clean    Surgeon(s):  Steven Mireles M.D.    Anesthesiologist/Type of Anesthesia:  Anesthesiologist: Crury Baer M.D./General    Surgical Staff:  Circulator: Andrea Henson R.N.  Relief Circulator: Chyna Huff R.N.  Scrub Person: Fawad Jimenez  First Assist: Tony Manuel P.A.-C.  Radiology Technologist: Timothy Sierra    Specimens removed if any:  * No specimens in log *    Estimated Blood Loss: minimal    Findings: see dictation    Complications: no known complications    Plan:  --NWB L hand, okay to WB thru left forearm if needed  --ancef x 2 doses postop  --readmit trauma surgery postop        8/18/2018 2:41 PM Steven Mireles M.D.

## 2018-08-18 NOTE — PROGRESS NOTES
2 RN skin check.  Patient transferred from ER to S109 via gurney on sideboard.  Log roll. Slide board removed.  Redness to coccyx (blanching). Splint to L. UE, patient able to move fingers.

## 2018-08-18 NOTE — CONSULTS
DATE OF SERVICE:  08/18/2018    REASON FOR CONSULTATION:  Vertebral artery occlusion.    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  His wife   states that this is a daily task and he was throwing hay at the top of the   haystack.  He became tangled in a cord and fell.  He had no loss of   consciousness in the field and walked to the car where he was transferred from   [facility] for cervical spine fractures.  The patient was transported by   his wife in their car to the [facility].    The patient denies any extremity weakness, but complains of left arm and neck   pain.    CT scan and CT angiogram demonstrates cervical spine fractures of the C1   posterior ring without displacement.  Fracture of the lateral mass of   bilateral C2 without displacement.  There is also a C5 fracture into the facet   joint and C6 pedicle fractures.  There is subluxation at the C5-C6 level and   occlusion of the right vertebral artery is also noted.  There is   reconstitution at the base of the skull.    The patient describes multiple prior traumatic events including injuries to   his neck and thoracic spine.    PHYSICAL EXAMINATION:  VITAL SIGNS:  Pulse is 61, temperature 36.3, blood pressure 143/71,   respirations 16.  HEENT:  Pupils are equal, round and reactive to light.  Extraocular muscles   appear intact.  GENERAL:  He is awake, alert, appropriate, and pleasant.  LUNGS:  Clear, but diminished bilaterally.  HEART:  Regular rate and rhythm without murmur.  ABDOMEN:  Soft and benign.  EXTREMITIES:  He has palpable posterior tibial pulses bilaterally.  On the   right, he has a palpable radial and brachial pulse.  On the left, he is in a   splint and I cannot examine his left brachial artery.  NEUROLOGIC:  His cranial nerves are grossly intact.  He is able to move the   fingers in both hands and his entire right upper extremity has  evidence of no   neurologic injury.    IMPRESSION:  This 72-year-old gentleman has suffered a left radius fracture,   cervical spine fractures, rib fractures and it is impossible to tell whether   his right vertebral artery injury is acute or chronic.  I think the safest   recommendation is at least 3 months of anticoagulation with at least 3 or 4   level occlusion of his vertebral artery.  However, I do not think this is   essential to be started right away and do not believe heparin drip is in the   patient's best interest.    PLAN:  I would recommend instituting Xarelto or Eliquis for an oral   anticoagulant when trauma treatment is complete and we were pretty sure that   he requires no further surgical intervention.  Standard DVT prophylaxis is   obviously appropriate and I will plan to see him back in my office.  I will   continue to follow along during his hospitalization.       ____________________________________     MD CARLINE Flores / RICKY    DD:  08/18/2018 13:46:11  DT:  08/18/2018 15:43:47    D#:  [id number]  Job#:  [id number]

## 2018-08-18 NOTE — ED NOTES
Pt fell of a haystack approximately 20-25 ft high. No LOC, pt walked into house and wife drove him to [facility] in [place] where they splinted his LUE and scanned C-Spine.Pt received 2 morphine and 4 zofran at [facility] in route. Pt transferred here via [facility].

## 2018-08-18 NOTE — PROGRESS NOTES
72yoM with multiple cervical spine fxs and left displaced distal radius fracture.  Planning ORIF left distal radius fracture.  See full dictated consult for further details.

## 2018-08-18 NOTE — CONSULTS
Patient seen and examined for c spine fractures and t spine fractures and right va dissection.  71 yo male s/p fall off hay bail sustained the above injuries and a left radius fracture.      Imaging studies show:        CT c spine    1.  C1 posterior ring fracture    2.  Bilateral C2 lateral mass fracture    3.  Left C5 lamina fracture extending into left C5-6 facet joint    4.  Bilateral C6 pedicle fracture with probable extension into right C6-7 facet joint    5.  Subluxation at C6-7    6.  Occlusion of right vertebral artery    7.  Left upper rib fracture with associated extrapleural hematoma          CTA neck    1.  Right vertebral artery dissection with associated occlusion extending from C5 to C1-2 and cephalad reconstitution    2.  No other acute finding    3.  Mild bilateral common carotid artery and internal carotid artery produced by atherosclerotic plaque    4.  Multiple vertebral body fracture described in detail on cervical spine CT report    5.  Left upper rib fracture and associated extrapleural hematoma    6.  Variant anatomy with left vertebral artery originating from the aortic arch      CT t spine      1.  T4 superior endplate compression fracture    2.  Probable mild superior endplate compression fracture of T3 and T6    3.  Multilevel spondylosis    Will dictate note.    Recommend c spine rigid cervical immobilization and anticoagulation.    OK for general anesthesia if no neck motion is introduced during intubation or during the surgery.    Ortho and Vascular have been consulted.    C spine and T spine fractures should heal in a SOMI brace or  brace.

## 2018-08-18 NOTE — PROGRESS NOTES
Pt to OR with transport and this RN, chart at bedside, report given to pre op team, pt to PRE OP14, family at bedside, care transferred at this time

## 2018-08-18 NOTE — DISCHARGE PLANNING
Trauma Response    Referral: Trauma yellow Response    Intervention: SW responded to trauma yellow.  Pt was BIB [facility] after transferring [facility].  Pt fell off a haystack this morning. Wife was on scene. Pt was alert upon arrival.  Pts name is Tony Garcia (: 1945).  SW obtained the following pt information: Per [facility], pt's wife Andree is on her way to [facility].  NO other needs at this time.     Plan: remain available for support

## 2018-08-18 NOTE — OR NURSING
Patient A+0x3. Denies pain or nausea.  Vss.  States he feels alittle sleepy.  Left hand numbness from left axillary nerve block done by Dr Baer.  To last approximately 18 to 24 hours.  Left arm elevated on pillow and ice to site.  Dressing clean and dry

## 2018-08-18 NOTE — CARE PLAN
Problem: Communication  Goal: The ability to communicate needs accurately and effectively will improve  Outcome: PROGRESSING AS EXPECTED  AAO X4    Problem: Knowledge Deficit  Goal: Knowledge of disease process/condition, treatment plan, diagnostic tests, and medications will improve  Outcome: PROGRESSING AS EXPECTED  Family and pt educated on injuries

## 2018-08-19 ENCOUNTER — APPOINTMENT (OUTPATIENT)
Dept: RADIOLOGY | Facility: MEDICAL CENTER | Age: 73
DRG: 510 | End: 2018-08-19
Attending: NURSE PRACTITIONER
Payer: MEDICARE

## 2018-08-19 PROBLEM — E03.9 HYPOTHYROID: Status: ACTIVE | Noted: 2018-08-19

## 2018-08-19 LAB
ALBUMIN SERPL BCP-MCNC: 3.7 G/DL (ref 3.2–4.9)
ALBUMIN/GLOB SERPL: 1.6 G/DL
ALP SERPL-CCNC: 42 U/L (ref 30–99)
ALT SERPL-CCNC: 20 U/L (ref 2–50)
ANION GAP SERPL CALC-SCNC: 11 MMOL/L (ref 0–11.9)
AST SERPL-CCNC: 29 U/L (ref 12–45)
BASOPHILS # BLD AUTO: 0.3 % (ref 0–1.8)
BASOPHILS # BLD: 0.04 K/UL (ref 0–0.12)
BILIRUB SERPL-MCNC: 0.7 MG/DL (ref 0.1–1.5)
BUN SERPL-MCNC: 12 MG/DL (ref 8–22)
CALCIUM SERPL-MCNC: 8.6 MG/DL (ref 8.5–10.5)
CHLORIDE SERPL-SCNC: 107 MMOL/L (ref 96–112)
CO2 SERPL-SCNC: 20 MMOL/L (ref 20–33)
CREAT SERPL-MCNC: 0.92 MG/DL (ref 0.5–1.4)
EOSINOPHIL # BLD AUTO: 0 K/UL (ref 0–0.51)
EOSINOPHIL NFR BLD: 0 % (ref 0–6.9)
ERYTHROCYTE [DISTWIDTH] IN BLOOD BY AUTOMATED COUNT: 45.3 FL (ref 35.9–50)
EST. AVERAGE GLUCOSE BLD GHB EST-MCNC: 117 MG/DL
GLOBULIN SER CALC-MCNC: 2.3 G/DL (ref 1.9–3.5)
GLUCOSE BLD-MCNC: 122 MG/DL (ref 65–99)
GLUCOSE BLD-MCNC: 135 MG/DL (ref 65–99)
GLUCOSE SERPL-MCNC: 137 MG/DL (ref 65–99)
HBA1C MFR BLD: 5.7 % (ref 0–5.6)
HCT VFR BLD AUTO: 38 % (ref 42–52)
HGB BLD-MCNC: 12.7 G/DL (ref 14–18)
IMM GRANULOCYTES # BLD AUTO: 0.07 K/UL (ref 0–0.11)
IMM GRANULOCYTES NFR BLD AUTO: 0.5 % (ref 0–0.9)
LYMPHOCYTES # BLD AUTO: 0.86 K/UL (ref 1–4.8)
LYMPHOCYTES NFR BLD: 6.6 % (ref 22–41)
MCH RBC QN AUTO: 29.7 PG (ref 27–33)
MCHC RBC AUTO-ENTMCNC: 33.4 G/DL (ref 33.7–35.3)
MCV RBC AUTO: 88.8 FL (ref 81.4–97.8)
MONOCYTES # BLD AUTO: 0.91 K/UL (ref 0–0.85)
MONOCYTES NFR BLD AUTO: 7 % (ref 0–13.4)
NEUTROPHILS # BLD AUTO: 11.07 K/UL (ref 1.82–7.42)
NEUTROPHILS NFR BLD: 85.6 % (ref 44–72)
NRBC # BLD AUTO: 0 K/UL
NRBC BLD-RTO: 0 /100 WBC
PLATELET # BLD AUTO: 204 K/UL (ref 164–446)
PMV BLD AUTO: 12.1 FL (ref 9–12.9)
POTASSIUM SERPL-SCNC: 4.2 MMOL/L (ref 3.6–5.5)
PROT SERPL-MCNC: 6 G/DL (ref 6–8.2)
RBC # BLD AUTO: 4.28 M/UL (ref 4.7–6.1)
SODIUM SERPL-SCNC: 138 MMOL/L (ref 135–145)
WBC # BLD AUTO: 13 K/UL (ref 4.8–10.8)

## 2018-08-19 PROCEDURE — 306637 HCHG MISC ORTHO ITEM RC 0274

## 2018-08-19 PROCEDURE — A9270 NON-COVERED ITEM OR SERVICE: HCPCS | Performed by: SURGERY

## 2018-08-19 PROCEDURE — 700111 HCHG RX REV CODE 636 W/ 250 OVERRIDE (IP): Performed by: ORTHOPAEDIC SURGERY

## 2018-08-19 PROCEDURE — 700102 HCHG RX REV CODE 250 W/ 637 OVERRIDE(OP): Performed by: SURGERY

## 2018-08-19 PROCEDURE — 99233 SBSQ HOSP IP/OBS HIGH 50: CPT | Performed by: SURGERY

## 2018-08-19 PROCEDURE — 85025 COMPLETE CBC W/AUTO DIFF WBC: CPT

## 2018-08-19 PROCEDURE — 71045 X-RAY EXAM CHEST 1 VIEW: CPT

## 2018-08-19 PROCEDURE — L0200 CERV COL SUPP ADJ BAR & THOR: HCPCS

## 2018-08-19 PROCEDURE — 83036 HEMOGLOBIN GLYCOSYLATED A1C: CPT

## 2018-08-19 PROCEDURE — 302128 INFUSION PUMP: Performed by: SURGERY

## 2018-08-19 PROCEDURE — 700105 HCHG RX REV CODE 258: Performed by: NURSE PRACTITIONER

## 2018-08-19 PROCEDURE — 80053 COMPREHEN METABOLIC PANEL: CPT

## 2018-08-19 PROCEDURE — 700111 HCHG RX REV CODE 636 W/ 250 OVERRIDE (IP): Performed by: NURSE PRACTITIONER

## 2018-08-19 PROCEDURE — 770006 HCHG ROOM/CARE - MED/SURG/GYN SEMI*

## 2018-08-19 PROCEDURE — 82962 GLUCOSE BLOOD TEST: CPT

## 2018-08-19 RX ORDER — CARVEDILOL 6.25 MG/1
6.25 TABLET ORAL 2 TIMES DAILY WITH MEALS
Status: DISCONTINUED | OUTPATIENT
Start: 2018-08-19 | End: 2018-08-22 | Stop reason: HOSPADM

## 2018-08-19 RX ORDER — DUTASTERIDE 0.5 MG/1
0.5 CAPSULE, LIQUID FILLED ORAL
COMMUNITY

## 2018-08-19 RX ORDER — OMEPRAZOLE 20 MG/1
20 CAPSULE, DELAYED RELEASE ORAL EVERY MORNING
COMMUNITY

## 2018-08-19 RX ORDER — OXYCODONE HYDROCHLORIDE 5 MG/1
5 TABLET ORAL EVERY 4 HOURS PRN
Status: DISCONTINUED | OUTPATIENT
Start: 2018-08-19 | End: 2018-08-22 | Stop reason: HOSPADM

## 2018-08-19 RX ORDER — RAMIPRIL 2.5 MG/1
2.5 CAPSULE ORAL
COMMUNITY

## 2018-08-19 RX ORDER — FENOFIBRATE 145 MG/1
145 TABLET, COATED ORAL EVERY MORNING
COMMUNITY

## 2018-08-19 RX ORDER — TERAZOSIN 1 MG/1
1 CAPSULE ORAL EVERY EVENING
Status: DISCONTINUED | OUTPATIENT
Start: 2018-08-19 | End: 2018-08-22 | Stop reason: HOSPADM

## 2018-08-19 RX ORDER — OXYBUTYNIN CHLORIDE 5 MG/1
5 TABLET, EXTENDED RELEASE ORAL DAILY
COMMUNITY
End: 2021-05-13

## 2018-08-19 RX ORDER — TERAZOSIN 1 MG/1
1 CAPSULE ORAL NIGHTLY
COMMUNITY

## 2018-08-19 RX ORDER — CARVEDILOL 6.25 MG/1
6.25 TABLET ORAL 2 TIMES DAILY WITH MEALS
COMMUNITY

## 2018-08-19 RX ORDER — LEVOTHYROXINE SODIUM 0.07 MG/1
75 TABLET ORAL
Status: DISCONTINUED | OUTPATIENT
Start: 2018-08-19 | End: 2018-08-22 | Stop reason: HOSPADM

## 2018-08-19 RX ORDER — LEVOTHYROXINE SODIUM 0.07 MG/1
75 TABLET ORAL
COMMUNITY
End: 2021-05-13

## 2018-08-19 RX ADMIN — ENOXAPARIN SODIUM 30 MG: 100 INJECTION SUBCUTANEOUS at 05:21

## 2018-08-19 RX ADMIN — RIVAROXABAN 20 MG: 20 TABLET, FILM COATED ORAL at 17:40

## 2018-08-19 RX ADMIN — CARVEDILOL 6.25 MG: 6.25 TABLET, FILM COATED ORAL at 11:37

## 2018-08-19 RX ADMIN — CEFAZOLIN SODIUM 2 G: 2 INJECTION, SOLUTION INTRAVENOUS at 05:21

## 2018-08-19 RX ADMIN — TERAZOSIN HYDROCHLORIDE 1 MG: 1 CAPSULE ORAL at 17:40

## 2018-08-19 RX ADMIN — CARVEDILOL 6.25 MG: 6.25 TABLET, FILM COATED ORAL at 17:40

## 2018-08-19 RX ADMIN — SODIUM CHLORIDE: 9 INJECTION, SOLUTION INTRAVENOUS at 00:00

## 2018-08-19 ASSESSMENT — PATIENT HEALTH QUESTIONNAIRE - PHQ9
SUM OF ALL RESPONSES TO PHQ9 QUESTIONS 1 AND 2: 0
1. LITTLE INTEREST OR PLEASURE IN DOING THINGS: NOT AT ALL
2. FEELING DOWN, DEPRESSED, IRRITABLE, OR HOPELESS: NOT AT ALL

## 2018-08-19 ASSESSMENT — PAIN SCALES - GENERAL
PAINLEVEL_OUTOF10: 2
PAINLEVEL_OUTOF10: 1
PAINLEVEL_OUTOF10: 2
PAINLEVEL_OUTOF10: 1
PAINLEVEL_OUTOF10: 2
PAINLEVEL_OUTOF10: 2
PAINLEVEL_OUTOF10: 1

## 2018-08-19 ASSESSMENT — COGNITIVE AND FUNCTIONAL STATUS - GENERAL
CLIMB 3 TO 5 STEPS WITH RAILING: A LOT
SUGGESTED CMS G CODE MODIFIER DAILY ACTIVITY: CK
TOILETING: A LITTLE
HELP NEEDED FOR BATHING: A LITTLE
STANDING UP FROM CHAIR USING ARMS: A LITTLE
MOBILITY SCORE: 16
PERSONAL GROOMING: A LITTLE
DRESSING REGULAR UPPER BODY CLOTHING: A LITTLE
SUGGESTED CMS G CODE MODIFIER MOBILITY: CK
WALKING IN HOSPITAL ROOM: A LOT
MOVING TO AND FROM BED TO CHAIR: A LITTLE
EATING MEALS: A LITTLE
DRESSING REGULAR LOWER BODY CLOTHING: A LITTLE
DAILY ACTIVITIY SCORE: 18
MOVING FROM LYING ON BACK TO SITTING ON SIDE OF FLAT BED: A LITTLE
TURNING FROM BACK TO SIDE WHILE IN FLAT BAD: A LITTLE

## 2018-08-19 ASSESSMENT — ENCOUNTER SYMPTOMS
DOUBLE VISION: 0
BACK PAIN: 0
MYALGIAS: 1
ABDOMINAL PAIN: 0
SHORTNESS OF BREATH: 1
SPEECH CHANGE: 0
HEADACHES: 0
FEVER: 0
NECK PAIN: 1
NAUSEA: 0

## 2018-08-19 ASSESSMENT — LIFESTYLE VARIABLES: SUBSTANCE_ABUSE: 0

## 2018-08-19 NOTE — CARE PLAN
Problem: Skin Integrity  Goal: Risk for impaired skin integrity will decrease    Intervention: Implement precautions to protect skin integrity in collaboration with the interdisciplinary team  Patient turned and repositioned every 2 hours with the use of pillows and the draw sheet. Full skin assessment completed ensuring no devices under skin and areas of concern noted. Mepilex padding placed under [place] J collar. Upper extremities floated on pillows.       Problem: Pain Management  Goal: Pain level will decrease to patient's comfort goal    Intervention: Follow pain managment plan developed in collaboration with patient and Interdisciplinary Team  Pain assessed and patient medicated per the MAR. Education provided on providing adequate pain control for patient.

## 2018-08-19 NOTE — PROGRESS NOTES
"  Trauma/Surgical Progress Note    Author: Simon Darden Date & Time created: 8/19/2018   12:21 PM     Interval Events:  Hospital day #2  Post op day #2    Transfer to Orthopedics  Disposition home next 24 - 48 hours  Therapies for daily activities    Review of Systems   Constitutional: Negative for fever.   Eyes: Negative for double vision.   Respiratory: Positive for shortness of breath.         Supplemental O2   Cardiovascular: Negative for chest pain.   Gastrointestinal: Negative for abdominal pain and nausea.   Genitourinary: Negative for dysuria.   Musculoskeletal: Positive for joint pain, myalgias and neck pain. Negative for back pain.        C1,2,4,5    Vertebral artery occlusion  Rib fractures     Skin: Negative for rash.   Neurological: Negative for speech change and headaches.   Psychiatric/Behavioral: Negative for substance abuse.     Hemodynamics:  Blood pressure 143/71, pulse 73, temperature 36.9 °C (98.4 °F), resp. rate (!) 21, height 1.905 m (6' 3\"), weight 113.8 kg (250 lb 14.1 oz), SpO2 93 %.     Respiratory:    Respiration: (!) 21, Pulse Oximetry: 93 %, O2 Daily Delivery Respiratory : Silicone Nasal Cannula     PEP/CPT Method: Positive Airway Pressure Device (20), Work Of Breathing / Effort: Mild;Shallow  RUL Breath Sounds: Clear, RML Breath Sounds: Clear, RLL Breath Sounds: Diminished, NATHANIEL Breath Sounds: Clear, LLL Breath Sounds: Diminished  Fluids:    Intake/Output Summary (Last 24 hours) at 08/19/18 1221  Last data filed at 08/19/18 1000   Gross per 24 hour   Intake             4700 ml   Output             3205 ml   Net             1495 ml     Admit Weight: 113.4 kg (250 lb)  Current Weight: 113.8 kg (250 lb 14.1 oz)    Physical Exam   Constitutional: He is oriented to person, place, and time. He appears well-nourished.   HENT:   Head: Atraumatic.   Eyes: Pupils are equal, round, and reactive to light.   Neck:    in place   Cardiovascular: Normal rate.    Pulmonary/Chest: Effort normal. " "He exhibits tenderness.   Rib fractures.   Abdominal: Soft.   Musculoskeletal: He exhibits edema and tenderness.   Limited left upper extremity  Radial fracture   Neurological: He is alert and oriented to person, place, and time.   Skin: Skin is warm.   Psychiatric: His behavior is normal.       Medical Decision Making/Problem List:    Active Hospital Problems    Diagnosis   • Closed fracture of cervical vertebra (HCC) [S12.9XXA]     Priority: High     CTA R vertebral occlusion  Outside images Bilateral posterior Arch C1 fracture  Left C2 lateral mass fracture  Left C5 lamina fracture into spinous process  Bilateral C6 pedicle fractures.   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  Steven Mireles MD. Orthopedic Surgery.       • Hypothyroid [E03.9]     Priority: Low     Premorbid  On synthroid     • Multiple trauma [T07.XXXA]     Priority: Low     Fall from 25\" Hay stack  Trauma yellow  Dr. Mooney, trauma      • Essential hypertension [I10]     Priority: Low     Premorbid  Resumee home medications  Ramipril 2.5 mg daily.  Carvedilol         • Type 2 diabetes mellitus (HCC) [E11.9]     Priority: Low     No home medications   Glucose monitoring.   Hg A1C - 5.7     • BPH (benign prostatic hyperplasia) [N40.0]     Priority: Low     Resume home medications when appropriate.  Terazosin  Dutasteride       Core Measures & Quality Metrics:  Labs reviewed, Medications reviewed " and Radiology images reviewed  Patel catheter: No Patel      DVT: xarelto.        Assessed for rehab: Patient returned to prior level of function, rehabilitation not indicated at this time    Total Score: 11  ETOH Screening     Intervention complete date: 8/19/2018  Patient response to intervention: negative for ETOH use.   Patient demonstrats understanding of intervention.Plan of care:    has not been contacted.Follow up with: PCP  Total ETOH intervention time: 15 - 30 mintues    Discussed patient condition with Family, RN, Patient and trauma surgery. Dr. Pyle

## 2018-08-19 NOTE — PROGRESS NOTES
Vascular    VSS afeb    Patient making laps around the unit, walking with family and staff.     Review of CTA shows 3 level occlusion of the vertebral artery with reconstitution at the skull base.  Impossible to tell if this is acute or chronic, but with patient's history and C-spine fractures, must assume an acute process.  He has no vertebrobasilar symptoms at present.      With the hope of recanalization, anticoagulation is indicated.  I will see him in the office, but follow-up with CTA

## 2018-08-19 NOTE — PROGRESS NOTES
Dr. Pagan at bedside, OK for pt to mobilize and have no HOB restrictions once  brace has been fitted.

## 2018-08-19 NOTE — PROGRESS NOTES
Submitted custom order for  brace to [facility], to check the status of the order please call [phone].

## 2018-08-19 NOTE — PROGRESS NOTES
"   Orthopaedic PA Progress Note    Interval changes:Alert in unit, doing well, no hand pain, mild numbness L index    ROS - Patient denies any new issues. No chest pain, dyspnea, or fever.  Pain well controlled.    Blood pressure 143/71, pulse 73, temperature 36.9 °C (98.4 °F), resp. rate (!) 21, height 1.905 m (6' 3\"), weight 113.8 kg (250 lb 14.1 oz), SpO2 93 %.    Patient seen and examined  No acute distress  Breathing non labored  RRR  Surgical Splint dressing is clean, dry, and intact. Patient clearly moves all five fingers without issue . Sensation is intact to light touch throughout median, ulnar, and radial nerve distributions. Strong and palpable radial pulses with capillary refill less than 2 seconds. No arm or hand discomfort.    Recent Labs      08/18/18   0927  08/19/18   0421   WBC  18.9*  13.0*   RBC  4.62*  4.28*   HEMOGLOBIN  13.6*  12.7*   HEMATOCRIT  40.0*  38.0*   MCV  86.6  88.8   MCH  29.4  29.7   MCHC  34.0  33.4*   RDW  43.5  45.3   PLATELETCT  222  204   MPV  12.2  12.1     Active Hospital Problems    Diagnosis   • Closed fracture of cervical vertebra (HCC) [S12.9XXA]     Priority: High     CTA R vertebral occlusion  Outside images Bilateral posterior Arch C1 fracture  Left C2 lateral mass fracture  Left C5 lamina fracture into spinous process  Bilateral C6 pedicle fractures       • Multiple rib fractures [S22.49XA]     Priority: High     Left superior rib fractures with associated small extrapleural hematoma  Aggressive pulmonary analgesia and pain control  Serial CXRs     • Dissection, 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  Steven Mireles MD. Orthopedic " "Surgery.       • Multiple trauma [T07.XXXA]     Priority: Low     Fall from 25\" Hay stack  Trauma yellow  Dr. Mooney, trauma      • Essential hypertension [I10]     Priority: Low     Resume home medications when appropriate.  Ramipril 2.5 mg daily.  Carvedilol         • Type 2 diabetes mellitus (HCC) [E11.9]     Priority: Low     No home medications   Glucose monitoring.   Check HgA1c     • BPH (benign prostatic hyperplasia) [N40.0]     Priority: Low     Resume home medications when appropriate.  Terazosin  Dutasteride         Assessment/Plan:  POD#1 S/P ORIF L distal radius  Wt bearing status - NWB LUE below elbow  PT/OT-initiated  Wound care:Splinted Dressing x 7-10 days  Drains - no   Patel-no  Sutures/Staples out- 10-14 days post operatively  Antibiotics: completed  DVT Prophylaxis- TEDS/SCDs/Foot pumps.   Chem VTE prophylaxisot indicated from ortho standpoint  Case Coordination for Discharge Planning - Disposition OK to discharge from Ortho perspective, f/u Dr. Mireles at [facility] in 7-10 days for splint removal, XR and suture removal.      "

## 2018-08-19 NOTE — PROGRESS NOTES
[facility] at bedside, brace fitted, per MD order, HOB elevated, OK for pt to mobilize while in brace

## 2018-08-19 NOTE — CARE PLAN
Problem: Communication  Goal: The ability to communicate needs accurately and effectively will improve  Outcome: PROGRESSING AS EXPECTED  AAO X4    Problem: Urinary Elimination:  Goal: Ability to reestablish a normal urinary elimination pattern will improve  Outcome: PROGRESSING AS EXPECTED  Patel in place, UO adequate    Problem: Pain Management  Goal: Pain level will decrease to patient's comfort goal  Outcome: PROGRESSING AS EXPECTED  PRNs available as needed

## 2018-08-20 ENCOUNTER — APPOINTMENT (OUTPATIENT)
Dept: RADIOLOGY | Facility: MEDICAL CENTER | Age: 73
DRG: 510 | End: 2018-08-20
Attending: NURSE PRACTITIONER
Payer: MEDICARE

## 2018-08-20 PROBLEM — S22.000A CLOSED COMPRESSION FRACTURE OF THORACIC VERTEBRA (HCC): Status: ACTIVE | Noted: 2018-08-20

## 2018-08-20 LAB
ALBUMIN SERPL BCP-MCNC: 3.5 G/DL (ref 3.2–4.9)
ALBUMIN/GLOB SERPL: 1.3 G/DL
ALP SERPL-CCNC: 43 U/L (ref 30–99)
ALT SERPL-CCNC: 18 U/L (ref 2–50)
ANION GAP SERPL CALC-SCNC: 8 MMOL/L (ref 0–11.9)
AST SERPL-CCNC: 28 U/L (ref 12–45)
BASOPHILS # BLD AUTO: 0.4 % (ref 0–1.8)
BASOPHILS # BLD: 0.04 K/UL (ref 0–0.12)
BILIRUB SERPL-MCNC: 0.9 MG/DL (ref 0.1–1.5)
BUN SERPL-MCNC: 14 MG/DL (ref 8–22)
CALCIUM SERPL-MCNC: 8.4 MG/DL (ref 8.5–10.5)
CHLORIDE SERPL-SCNC: 103 MMOL/L (ref 96–112)
CO2 SERPL-SCNC: 24 MMOL/L (ref 20–33)
CREAT SERPL-MCNC: 0.92 MG/DL (ref 0.5–1.4)
EOSINOPHIL # BLD AUTO: 0.16 K/UL (ref 0–0.51)
EOSINOPHIL NFR BLD: 1.4 % (ref 0–6.9)
ERYTHROCYTE [DISTWIDTH] IN BLOOD BY AUTOMATED COUNT: 44.7 FL (ref 35.9–50)
GLOBULIN SER CALC-MCNC: 2.6 G/DL (ref 1.9–3.5)
GLUCOSE SERPL-MCNC: 125 MG/DL (ref 65–99)
HCT VFR BLD AUTO: 38 % (ref 42–52)
HGB BLD-MCNC: 12.5 G/DL (ref 14–18)
IMM GRANULOCYTES # BLD AUTO: 0.08 K/UL (ref 0–0.11)
IMM GRANULOCYTES NFR BLD AUTO: 0.7 % (ref 0–0.9)
LYMPHOCYTES # BLD AUTO: 1.18 K/UL (ref 1–4.8)
LYMPHOCYTES NFR BLD: 10.4 % (ref 22–41)
MAGNESIUM SERPL-MCNC: 2 MG/DL (ref 1.5–2.5)
MCH RBC QN AUTO: 29 PG (ref 27–33)
MCHC RBC AUTO-ENTMCNC: 32.9 G/DL (ref 33.7–35.3)
MCV RBC AUTO: 88.2 FL (ref 81.4–97.8)
MONOCYTES # BLD AUTO: 1.3 K/UL (ref 0–0.85)
MONOCYTES NFR BLD AUTO: 11.4 % (ref 0–13.4)
NEUTROPHILS # BLD AUTO: 8.6 K/UL (ref 1.82–7.42)
NEUTROPHILS NFR BLD: 75.7 % (ref 44–72)
NRBC # BLD AUTO: 0 K/UL
NRBC BLD-RTO: 0 /100 WBC
PHOSPHATE SERPL-MCNC: 2 MG/DL (ref 2.5–4.5)
PLATELET # BLD AUTO: 188 K/UL (ref 164–446)
PMV BLD AUTO: 11.9 FL (ref 9–12.9)
POTASSIUM SERPL-SCNC: 3.9 MMOL/L (ref 3.6–5.5)
PROT SERPL-MCNC: 6.1 G/DL (ref 6–8.2)
RBC # BLD AUTO: 4.31 M/UL (ref 4.7–6.1)
SODIUM SERPL-SCNC: 135 MMOL/L (ref 135–145)
WBC # BLD AUTO: 11.4 K/UL (ref 4.8–10.8)

## 2018-08-20 PROCEDURE — 700112 HCHG RX REV CODE 229: Performed by: NURSE PRACTITIONER

## 2018-08-20 PROCEDURE — 700102 HCHG RX REV CODE 250 W/ 637 OVERRIDE(OP): Performed by: SURGERY

## 2018-08-20 PROCEDURE — 84100 ASSAY OF PHOSPHORUS: CPT

## 2018-08-20 PROCEDURE — 71045 X-RAY EXAM CHEST 1 VIEW: CPT

## 2018-08-20 PROCEDURE — 700102 HCHG RX REV CODE 250 W/ 637 OVERRIDE(OP): Performed by: NURSE PRACTITIONER

## 2018-08-20 PROCEDURE — 85025 COMPLETE CBC W/AUTO DIFF WBC: CPT

## 2018-08-20 PROCEDURE — 700105 HCHG RX REV CODE 258: Performed by: NURSE PRACTITIONER

## 2018-08-20 PROCEDURE — 83735 ASSAY OF MAGNESIUM: CPT

## 2018-08-20 PROCEDURE — 700101 HCHG RX REV CODE 250: Performed by: NURSE PRACTITIONER

## 2018-08-20 PROCEDURE — 80053 COMPREHEN METABOLIC PANEL: CPT

## 2018-08-20 PROCEDURE — 770006 HCHG ROOM/CARE - MED/SURG/GYN SEMI*

## 2018-08-20 PROCEDURE — A9270 NON-COVERED ITEM OR SERVICE: HCPCS | Performed by: NURSE PRACTITIONER

## 2018-08-20 PROCEDURE — A9270 NON-COVERED ITEM OR SERVICE: HCPCS | Performed by: SURGERY

## 2018-08-20 PROCEDURE — 36415 COLL VENOUS BLD VENIPUNCTURE: CPT

## 2018-08-20 RX ORDER — MORPHINE SULFATE 4 MG/ML
4 INJECTION, SOLUTION INTRAMUSCULAR; INTRAVENOUS EVERY 4 HOURS PRN
Status: DISCONTINUED | OUTPATIENT
Start: 2018-08-20 | End: 2018-08-22 | Stop reason: HOSPADM

## 2018-08-20 RX ADMIN — CARVEDILOL 6.25 MG: 6.25 TABLET, FILM COATED ORAL at 17:11

## 2018-08-20 RX ADMIN — POTASSIUM PHOSPHATE, MONOBASIC AND POTASSIUM PHOSPHATE, DIBASIC 30 MMOL: 224; 236 INJECTION, SOLUTION INTRAVENOUS at 11:07

## 2018-08-20 RX ADMIN — LEVOTHYROXINE SODIUM 75 MCG: 75 TABLET ORAL at 05:06

## 2018-08-20 RX ADMIN — DOCUSATE SODIUM -SENNOSIDES 1 TABLET: 50; 8.6 TABLET, COATED ORAL at 19:54

## 2018-08-20 RX ADMIN — CARVEDILOL 6.25 MG: 6.25 TABLET, FILM COATED ORAL at 08:33

## 2018-08-20 RX ADMIN — DOCUSATE SODIUM 100 MG: 100 CAPSULE, LIQUID FILLED ORAL at 17:11

## 2018-08-20 RX ADMIN — RIVAROXABAN 20 MG: 20 TABLET, FILM COATED ORAL at 17:11

## 2018-08-20 RX ADMIN — TERAZOSIN HYDROCHLORIDE 1 MG: 1 CAPSULE ORAL at 19:53

## 2018-08-20 ASSESSMENT — ENCOUNTER SYMPTOMS
NEUROLOGICAL NEGATIVE: 1
PSYCHIATRIC NEGATIVE: 1
CONSTITUTIONAL NEGATIVE: 1
MYALGIAS: 1
RESPIRATORY NEGATIVE: 1

## 2018-08-20 ASSESSMENT — LIFESTYLE VARIABLES: ALCOHOL_USE: NO

## 2018-08-20 ASSESSMENT — PAIN SCALES - GENERAL
PAINLEVEL_OUTOF10: ASSUMED PAIN PRESENT
PAINLEVEL_OUTOF10: 1

## 2018-08-20 NOTE — PROGRESS NOTES
72yoM with multiple cervical spine fxs admitted to trauma surgery service.  Has left displaced distal radius fracture s/p ORIF 8/18.    S: wrist is doing well, having some issues with c-spine brace comfort    O:    Vitals:    08/20/18 0400 08/20/18 0803 08/20/18 0823 08/20/18 1157   BP: 155/84  148/77 142/74   Pulse: 64 72 71 66   Resp: 16 17 16 18   Temp: 36.2 °C (97.1 °F)  36.8 °C (98.2 °F) 36.7 °C (98 °F)   SpO2: 98% 95% 94% 92%   Weight:       Height:         Exam:  General-NAD, alert and oriented, following commands,  brace in place  LUE- flexing/extending all fingers, BCR and SILT in fingers, splint c/d/i    A: 72yoM with multiple cervical spine fxs admitted to trauma surgery service.  Has left displaced distal radius fracture s/p ORIF 8/18.    Recs:  --NWB L hand, okay to WB thru left forearm if needed  --fu 2 weeks postop for staples removal

## 2018-08-20 NOTE — CARE PLAN
Problem: Safety  Goal: Will remain free from falls    Intervention: Implement fall precautions   08/19/18 2000 08/20/18 0031   OTHER   Environmental Precautions Treaded Slipper Socks on Patient;Personal Belongings, Wastebasket, Call Bell etc. in Easy Reach;Transferred to Stronger Side;Report Given to Other Health Care Providers Regarding Fall Risk;Bed in Low Position;Communication Sign for Patients & Families;Mobility Assessed & Appropriate Sign Placed --    Bedrails --  Bedrails Closest to Bathroom Down   Bed Alarm --  Yes - Alarm On         Problem: Skin Integrity  Goal: Risk for impaired skin integrity will decrease    Intervention: Implement precautions to protect skin integrity in collaboration with the interdisciplinary team   08/20/18 0031   OTHER   Skin Preventative Measures (mepilex to bony prominences in contact with  brace)   Bed Types Pressure Redistribution Mattress (Atmosair)   Friction Interventions Draw Sheet / Pad Used for Repositioning   Activity  Bed   Patient Turns / Repositioning Patient Turns Self from Side to Side   Assistance / Tolerance for Turning/Repositioning Standby Assist   Patient is Receiving Nutrition Oral Intake Adequate

## 2018-08-20 NOTE — PROGRESS NOTES
2 RN skin check done with [facility]. Patient has dryness to bilateral heels; refused to be turned or moved at this time due to pain - coccyx and buttock areas not assessed; preventative mepilex noted on skin where  brace touches; all other bony prominences checked and free of irregularities.

## 2018-08-20 NOTE — DISCHARGE PLANNING
Anticipated Discharge Disposition: Home    Action: LSW faxed over pt's [facility] [place] ([phone][facility] to get prior auth and copay price. LSW called [facility] in [place] ([phone]) and spoke w/ Jessy. Jessy from [facility] stated that pt has prior auth for Xarelto and the copay is $28 dollars. Jessy from [facility] stated that the prescription would be filled tomorrow (8-21).    Barriers to Discharge: None    Plan: TBD

## 2018-08-20 NOTE — PROGRESS NOTES
"  Trauma/Surgical Progress Note    Author: Griselda Nelson Date & Time created: 8/20/2018   8:25 AM     Interval Events:  HD # 2 - Fall from [facility] - polytrauma  POS #2 - left wrist ORIF with Dr. Mireles  Transfer to [facility]   Cervical spine fractures with vertebral artery dissection   x 12 week, Xarelto  Rib fractures -  CXR stable  Phosphorous 2.0 - replaced  Adequate pain control   PT/OT eval pending  Plan for home tomorrow     Review of Systems   Constitutional: Negative.    HENT: Negative.    Respiratory: Negative.    Genitourinary: Negative.    Musculoskeletal: Positive for myalgias.   Neurological: Negative.    Psychiatric/Behavioral: Negative.    All other systems reviewed and are negative.    Hemodynamics:  Blood pressure 148/77, pulse 71, temperature 36.8 °C (98.2 °F), resp. rate 16, height 1.905 m (6' 3\"), weight 113.8 kg (250 lb 14.1 oz), SpO2 94 %.     Respiratory:    Respiration: 16, Pulse Oximetry: 94 %, O2 Daily Delivery Respiratory : Silicone Nasal Cannula     Work Of Breathing / Effort: Mild  RUL Breath Sounds: Clear, RML Breath Sounds: Diminished, RLL Breath Sounds: Diminished, NATHANIEL Breath Sounds: Clear, LLL Breath Sounds: Diminished  Fluids:    Intake/Output Summary (Last 24 hours) at 08/20/18 0825  Last data filed at 08/20/18 0600   Gross per 24 hour   Intake             1040 ml   Output             1850 ml   Net             -810 ml     Admit Weight: 113.4 kg (250 lb)  Current      Physical Exam   Constitutional: He is oriented to person, place, and time. He appears well-developed. No distress.   Eyes: Conjunctivae are normal.   Neck:    in place    Pulmonary/Chest: Effort normal. No respiratory distress.   Abdominal: There is no tenderness.   Musculoskeletal:   Left wrist in post op splint- Distal circulation and sensation intact .   Neurological: He is alert and oriented to person, place, and time.   Skin: Skin is warm and dry.   Nursing note and vitals reviewed.      Medical Decision " "Making/Problem List:    Active Hospital Problems    Diagnosis   • Closed fracture of cervical vertebra (HCC) [S12.9XXA]     Priority: High     CTA R vertebral occlusion  Outside images Bilateral posterior Arch C1 fracture  Left C2 lateral mass fracture  Left C5 lamina fracture into spinous process  Bilateral C6 pedicle fractures.   x 12 weeks  Hung Pagan MD. 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  Steven Mireles MD. Orthopedic Surgery.      • Hypothyroid [E03.9]     Priority: Low     Premorbid  Synthroid resumed     • Multiple trauma [T07.XXXA]     Priority: Low     Fall from 25\" Hay stack  [facility]ially seen at [facility]  Trauma yellow transfer      • Essential hypertension [I10]     Priority: Low     Premorbid  Resumed home medications  Ramipril 2.5 mg daily.  Carvedilol     • Type 2 diabetes mellitus (HCC) [E11.9]     Priority: Low     No home medications   Glucose monitoring.   Hg A1C - 5.7      • BPH (benign prostatic hyperplasia) [N40.0]     Priority: Low     Resume home medications when appropriate.  Terazosin  Dutasteride        Core Measures & Quality Metrics:  Labs reviewed and Medications reviewed  Patel catheter: Critically Ill - Requiring Accurate Measurement of Urinary Output      DVT: Xarelto.    Ulcer prophylaxis: Not indicated    Assessed for rehab: Patient was assess for and/or received rehabilitation " services during this hospitalization    Total Score: 9  ETOH Screening     Intervention complete date: 8/19/2018  Patient response to intervention: negative for ETOH use.   Patient demonstrats understanding of intervention.Plan of care:    has not been contacted.Follow up with: PCP  Total ETOH intervention time: 15 - 30 mintues  Discussed patient condition with Family, RN, Patient and trauma surgery. Dr. Mooney

## 2018-08-20 NOTE — PROGRESS NOTES
Patel removed at 1800, pt aware of transfer, report called to Betty KEITH, [facility] with all belongings, chart, and meds.

## 2018-08-20 NOTE — PROGRESS NOTES
Pt transferred to floor via wheelchair. Alert and oriented x4. Brace on. Denies pain and appears to be on no distress. Oriented to room and call light.

## 2018-08-21 LAB
ALBUMIN SERPL BCP-MCNC: 3.7 G/DL (ref 3.2–4.9)
ALBUMIN/GLOB SERPL: 1.5 G/DL
ALP SERPL-CCNC: 48 U/L (ref 30–99)
ALT SERPL-CCNC: 19 U/L (ref 2–50)
ANION GAP SERPL CALC-SCNC: 10 MMOL/L (ref 0–11.9)
AST SERPL-CCNC: 24 U/L (ref 12–45)
BASOPHILS # BLD AUTO: 0.5 % (ref 0–1.8)
BASOPHILS # BLD: 0.05 K/UL (ref 0–0.12)
BILIRUB SERPL-MCNC: 1 MG/DL (ref 0.1–1.5)
BUN SERPL-MCNC: 17 MG/DL (ref 8–22)
CALCIUM SERPL-MCNC: 8.7 MG/DL (ref 8.5–10.5)
CHLORIDE SERPL-SCNC: 103 MMOL/L (ref 96–112)
CO2 SERPL-SCNC: 23 MMOL/L (ref 20–33)
CREAT SERPL-MCNC: 0.92 MG/DL (ref 0.5–1.4)
EOSINOPHIL # BLD AUTO: 0.41 K/UL (ref 0–0.51)
EOSINOPHIL NFR BLD: 4.3 % (ref 0–6.9)
ERYTHROCYTE [DISTWIDTH] IN BLOOD BY AUTOMATED COUNT: 42.6 FL (ref 35.9–50)
GLOBULIN SER CALC-MCNC: 2.5 G/DL (ref 1.9–3.5)
GLUCOSE SERPL-MCNC: 115 MG/DL (ref 65–99)
HCT VFR BLD AUTO: 38.4 % (ref 42–52)
HGB BLD-MCNC: 12.8 G/DL (ref 14–18)
IMM GRANULOCYTES # BLD AUTO: 0.06 K/UL (ref 0–0.11)
IMM GRANULOCYTES NFR BLD AUTO: 0.6 % (ref 0–0.9)
LYMPHOCYTES # BLD AUTO: 1.21 K/UL (ref 1–4.8)
LYMPHOCYTES NFR BLD: 12.8 % (ref 22–41)
MCH RBC QN AUTO: 28.8 PG (ref 27–33)
MCHC RBC AUTO-ENTMCNC: 33.3 G/DL (ref 33.7–35.3)
MCV RBC AUTO: 86.5 FL (ref 81.4–97.8)
MONOCYTES # BLD AUTO: 0.96 K/UL (ref 0–0.85)
MONOCYTES NFR BLD AUTO: 10.2 % (ref 0–13.4)
NEUTROPHILS # BLD AUTO: 6.74 K/UL (ref 1.82–7.42)
NEUTROPHILS NFR BLD: 71.6 % (ref 44–72)
NRBC # BLD AUTO: 0 K/UL
NRBC BLD-RTO: 0 /100 WBC
PLATELET # BLD AUTO: 208 K/UL (ref 164–446)
PMV BLD AUTO: 11.8 FL (ref 9–12.9)
POTASSIUM SERPL-SCNC: 3.9 MMOL/L (ref 3.6–5.5)
PROT SERPL-MCNC: 6.2 G/DL (ref 6–8.2)
RBC # BLD AUTO: 4.44 M/UL (ref 4.7–6.1)
SODIUM SERPL-SCNC: 136 MMOL/L (ref 135–145)
WBC # BLD AUTO: 9.4 K/UL (ref 4.8–10.8)

## 2018-08-21 PROCEDURE — G8978 MOBILITY CURRENT STATUS: HCPCS | Mod: CK

## 2018-08-21 PROCEDURE — A9270 NON-COVERED ITEM OR SERVICE: HCPCS | Performed by: NURSE PRACTITIONER

## 2018-08-21 PROCEDURE — 80053 COMPREHEN METABOLIC PANEL: CPT

## 2018-08-21 PROCEDURE — 770006 HCHG ROOM/CARE - MED/SURG/GYN SEMI*

## 2018-08-21 PROCEDURE — A9270 NON-COVERED ITEM OR SERVICE: HCPCS | Performed by: SURGERY

## 2018-08-21 PROCEDURE — 700112 HCHG RX REV CODE 229: Performed by: NURSE PRACTITIONER

## 2018-08-21 PROCEDURE — 97162 PT EVAL MOD COMPLEX 30 MIN: CPT

## 2018-08-21 PROCEDURE — G8988 SELF CARE GOAL STATUS: HCPCS | Mod: CI

## 2018-08-21 PROCEDURE — G8979 MOBILITY GOAL STATUS: HCPCS | Mod: CJ

## 2018-08-21 PROCEDURE — 700102 HCHG RX REV CODE 250 W/ 637 OVERRIDE(OP): Performed by: NURSE PRACTITIONER

## 2018-08-21 PROCEDURE — 700102 HCHG RX REV CODE 250 W/ 637 OVERRIDE(OP): Performed by: SURGERY

## 2018-08-21 PROCEDURE — 85025 COMPLETE CBC W/AUTO DIFF WBC: CPT

## 2018-08-21 PROCEDURE — G8987 SELF CARE CURRENT STATUS: HCPCS | Mod: CK

## 2018-08-21 PROCEDURE — 97166 OT EVAL MOD COMPLEX 45 MIN: CPT

## 2018-08-21 PROCEDURE — 36415 COLL VENOUS BLD VENIPUNCTURE: CPT

## 2018-08-21 RX ORDER — OXYCODONE HYDROCHLORIDE 5 MG/1
5 TABLET ORAL EVERY 4 HOURS PRN
Qty: 20 TAB | Refills: 0 | Status: SHIPPED | OUTPATIENT
Start: 2018-08-21 | End: 2018-08-28

## 2018-08-21 RX ADMIN — TERAZOSIN HYDROCHLORIDE 1 MG: 1 CAPSULE ORAL at 17:17

## 2018-08-21 RX ADMIN — RIVAROXABAN 20 MG: 20 TABLET, FILM COATED ORAL at 17:17

## 2018-08-21 RX ADMIN — MAGNESIUM HYDROXIDE 30 ML: 400 SUSPENSION ORAL at 05:44

## 2018-08-21 RX ADMIN — POLYETHYLENE GLYCOL 3350 1 PACKET: 17 POWDER, FOR SOLUTION ORAL at 05:44

## 2018-08-21 RX ADMIN — CARVEDILOL 6.25 MG: 6.25 TABLET, FILM COATED ORAL at 17:17

## 2018-08-21 RX ADMIN — CARVEDILOL 6.25 MG: 6.25 TABLET, FILM COATED ORAL at 05:44

## 2018-08-21 RX ADMIN — DOCUSATE SODIUM 100 MG: 100 CAPSULE, LIQUID FILLED ORAL at 05:43

## 2018-08-21 RX ADMIN — LEVOTHYROXINE SODIUM 75 MCG: 75 TABLET ORAL at 05:44

## 2018-08-21 ASSESSMENT — COGNITIVE AND FUNCTIONAL STATUS - GENERAL
TURNING FROM BACK TO SIDE WHILE IN FLAT BAD: A LITTLE
DRESSING REGULAR UPPER BODY CLOTHING: A LOT
DAILY ACTIVITIY SCORE: 18
CLIMB 3 TO 5 STEPS WITH RAILING: A LITTLE
STANDING UP FROM CHAIR USING ARMS: A LITTLE
MOVING FROM LYING ON BACK TO SITTING ON SIDE OF FLAT BED: A LITTLE
PERSONAL GROOMING: A LITTLE
DRESSING REGULAR LOWER BODY CLOTHING: A LITTLE
SUGGESTED CMS G CODE MODIFIER MOBILITY: CK
WALKING IN HOSPITAL ROOM: A LITTLE
HELP NEEDED FOR BATHING: A LITTLE
SUGGESTED CMS G CODE MODIFIER DAILY ACTIVITY: CK
TOILETING: A LITTLE
MOVING TO AND FROM BED TO CHAIR: A LITTLE
MOBILITY SCORE: 18

## 2018-08-21 ASSESSMENT — ENCOUNTER SYMPTOMS
NEUROLOGICAL NEGATIVE: 1
RESPIRATORY NEGATIVE: 1
MYALGIAS: 1
PSYCHIATRIC NEGATIVE: 1
CONSTITUTIONAL NEGATIVE: 1

## 2018-08-21 ASSESSMENT — GAIT ASSESSMENTS
ASSISTIVE DEVICE: FRONT WHEEL WALKER
DEVIATION: STEP TO;DECREASED BASE OF SUPPORT
GAIT LEVEL OF ASSIST: STAND BY ASSIST
DISTANCE (FEET): 100

## 2018-08-21 ASSESSMENT — PAIN SCALES - GENERAL
PAINLEVEL_OUTOF10: ASSUMED PAIN PRESENT
PAINLEVEL_OUTOF10: 0

## 2018-08-21 ASSESSMENT — ACTIVITIES OF DAILY LIVING (ADL): TOILETING: INDEPENDENT

## 2018-08-21 NOTE — CARE PLAN
rec'd report from Molly RN at 0711 and assumed care of this pt. Pt is sitting up in bed, awake/A&O x4 w/no ss of distress noted at this time. Pt denies pain/needs/complaints. Safety measures in place, call light w/in reach.
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
## 2018-08-21 NOTE — FACE TO FACE
Face to Face Supporting Documentation - Home Health    The encounter with this patient was in whole or in part the primary reason for home health admission.    Date of encounter:   Patient:                    MRN:                       YOB: 2018  Tony Garcia  [id number]  1945     Home health to see patient for:  Skilled Nursing care for assessment, interventions & education and Physical Therapy evaluation and treatment    Skilled need for:  New Onset Medical Diagnosis cervical, thoracic fracture and left arm fracture     Skilled nursing interventions to include:  Comment: continued care    Homebound status evidenced by:  Need the aid of supportive devices such as crutches, canes, wheelchairs or walkers. Leaving home requires a considerable and taxing effort. There is a normal inability to leave the home.    Community Physician to provide follow up care: Kg Maldonado M.D.     Optional Interventions? No      I certify the face to face encounter for this home health care referral meets the CMS requirements and the encounter/clinical assessment with the patient was, in whole, or in part, for the medical condition(s) listed above, which is the primary reason for home health care. Based on my clinical findings: the service(s) are medically necessary, support the need for home health care, and the homebound criteria are met.  I certify that this patient has had a face to face encounter by myself.  DAVID Avery. - NPI: [id number]
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
## 2018-08-21 NOTE — CARE PLAN
Problem: Venous Thromboembolism (VTW)/Deep Vein Thrombosis (DVT) Prevention:  Goal: Patient will participate in Venous Thrombosis (VTE)/Deep Vein Thrombosis (DVT)Prevention Measures  Outcome: PROGRESSING AS EXPECTED      Problem: Urinary Elimination:  Goal: Ability to reestablish a normal urinary elimination pattern will improve  Outcome: PROGRESSING AS EXPECTED
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
## 2018-08-21 NOTE — PROGRESS NOTES
Patient AAOx4. With CDI dressing on left arm. No complaints of shortness of breath or pain at the moment. VSS. Maintained on  brace. Call light within easy reach. Hourly rounding in place. Clustered nursing interventions to promote rest. Instructed to call for assistance whenever needed.
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
## 2018-08-22 VITALS
HEART RATE: 61 BPM | HEIGHT: 75 IN | DIASTOLIC BLOOD PRESSURE: 71 MMHG | OXYGEN SATURATION: 92 % | WEIGHT: 250.88 LBS | RESPIRATION RATE: 16 BRPM | TEMPERATURE: 98.1 F | BODY MASS INDEX: 31.19 KG/M2 | SYSTOLIC BLOOD PRESSURE: 141 MMHG

## 2018-08-22 LAB
ALBUMIN SERPL BCP-MCNC: 3.8 G/DL (ref 3.2–4.9)
ALBUMIN/GLOB SERPL: 1.4 G/DL
ALP SERPL-CCNC: 58 U/L (ref 30–99)
ALT SERPL-CCNC: 21 U/L (ref 2–50)
ANION GAP SERPL CALC-SCNC: 13 MMOL/L (ref 0–11.9)
AST SERPL-CCNC: 24 U/L (ref 12–45)
BASOPHILS # BLD AUTO: 0.7 % (ref 0–1.8)
BASOPHILS # BLD: 0.06 K/UL (ref 0–0.12)
BILIRUB SERPL-MCNC: 1 MG/DL (ref 0.1–1.5)
BUN SERPL-MCNC: 22 MG/DL (ref 8–22)
CALCIUM SERPL-MCNC: 8.8 MG/DL (ref 8.5–10.5)
CHLORIDE SERPL-SCNC: 100 MMOL/L (ref 96–112)
CO2 SERPL-SCNC: 20 MMOL/L (ref 20–33)
CREAT SERPL-MCNC: 0.95 MG/DL (ref 0.5–1.4)
EOSINOPHIL # BLD AUTO: 0.44 K/UL (ref 0–0.51)
EOSINOPHIL NFR BLD: 5.1 % (ref 0–6.9)
ERYTHROCYTE [DISTWIDTH] IN BLOOD BY AUTOMATED COUNT: 42.2 FL (ref 35.9–50)
GLOBULIN SER CALC-MCNC: 2.8 G/DL (ref 1.9–3.5)
GLUCOSE SERPL-MCNC: 119 MG/DL (ref 65–99)
HCT VFR BLD AUTO: 39.5 % (ref 42–52)
HGB BLD-MCNC: 13.2 G/DL (ref 14–18)
IMM GRANULOCYTES # BLD AUTO: 0.05 K/UL (ref 0–0.11)
IMM GRANULOCYTES NFR BLD AUTO: 0.6 % (ref 0–0.9)
LYMPHOCYTES # BLD AUTO: 1.17 K/UL (ref 1–4.8)
LYMPHOCYTES NFR BLD: 13.6 % (ref 22–41)
MCH RBC QN AUTO: 28.9 PG (ref 27–33)
MCHC RBC AUTO-ENTMCNC: 33.4 G/DL (ref 33.7–35.3)
MCV RBC AUTO: 86.4 FL (ref 81.4–97.8)
MONOCYTES # BLD AUTO: 0.79 K/UL (ref 0–0.85)
MONOCYTES NFR BLD AUTO: 9.2 % (ref 0–13.4)
NEUTROPHILS # BLD AUTO: 6.12 K/UL (ref 1.82–7.42)
NEUTROPHILS NFR BLD: 70.8 % (ref 44–72)
NRBC # BLD AUTO: 0 K/UL
NRBC BLD-RTO: 0 /100 WBC
PLATELET # BLD AUTO: 244 K/UL (ref 164–446)
PMV BLD AUTO: 11.8 FL (ref 9–12.9)
POTASSIUM SERPL-SCNC: 3.9 MMOL/L (ref 3.6–5.5)
PROT SERPL-MCNC: 6.6 G/DL (ref 6–8.2)
RBC # BLD AUTO: 4.57 M/UL (ref 4.7–6.1)
SODIUM SERPL-SCNC: 133 MMOL/L (ref 135–145)
WBC # BLD AUTO: 8.6 K/UL (ref 4.8–10.8)

## 2018-08-22 PROCEDURE — 97116 GAIT TRAINING THERAPY: CPT

## 2018-08-22 PROCEDURE — G8979 MOBILITY GOAL STATUS: HCPCS | Mod: CI

## 2018-08-22 PROCEDURE — A9270 NON-COVERED ITEM OR SERVICE: HCPCS | Performed by: NURSE PRACTITIONER

## 2018-08-22 PROCEDURE — 36415 COLL VENOUS BLD VENIPUNCTURE: CPT

## 2018-08-22 PROCEDURE — 85025 COMPLETE CBC W/AUTO DIFF WBC: CPT

## 2018-08-22 PROCEDURE — A9270 NON-COVERED ITEM OR SERVICE: HCPCS | Performed by: SURGERY

## 2018-08-22 PROCEDURE — 700102 HCHG RX REV CODE 250 W/ 637 OVERRIDE(OP): Performed by: SURGERY

## 2018-08-22 PROCEDURE — 80053 COMPREHEN METABOLIC PANEL: CPT

## 2018-08-22 PROCEDURE — G8980 MOBILITY D/C STATUS: HCPCS | Mod: CI

## 2018-08-22 PROCEDURE — 94760 N-INVAS EAR/PLS OXIMETRY 1: CPT

## 2018-08-22 PROCEDURE — 700112 HCHG RX REV CODE 229: Performed by: NURSE PRACTITIONER

## 2018-08-22 RX ADMIN — LEVOTHYROXINE SODIUM 75 MCG: 75 TABLET ORAL at 05:30

## 2018-08-22 RX ADMIN — CARVEDILOL 6.25 MG: 6.25 TABLET, FILM COATED ORAL at 05:31

## 2018-08-22 RX ADMIN — DOCUSATE SODIUM 100 MG: 100 CAPSULE, LIQUID FILLED ORAL at 05:30

## 2018-08-22 ASSESSMENT — COGNITIVE AND FUNCTIONAL STATUS - GENERAL
MOBILITY SCORE: 22
WALKING IN HOSPITAL ROOM: A LITTLE
CLIMB 3 TO 5 STEPS WITH RAILING: A LITTLE
SUGGESTED CMS G CODE MODIFIER MOBILITY: CJ

## 2018-08-22 ASSESSMENT — PATIENT HEALTH QUESTIONNAIRE - PHQ9
SUM OF ALL RESPONSES TO PHQ9 QUESTIONS 1 AND 2: 0
1. LITTLE INTEREST OR PLEASURE IN DOING THINGS: NOT AT ALL

## 2018-08-22 ASSESSMENT — PAIN SCALES - GENERAL: PAINLEVEL_OUTOF10: ASSUMED PAIN PRESENT

## 2018-08-22 ASSESSMENT — GAIT ASSESSMENTS
DEVIATION: STEP TO;DECREASED BASE OF SUPPORT
GAIT LEVEL OF ASSIST: SUPERVISED
DISTANCE (FEET): 350
ASSISTIVE DEVICE: SINGLE POINT CANE

## 2018-08-22 NOTE — DISCHARGE INSTRUCTIONS
Discharge Instructions    Discharged to home by car with relative. Discharged via wheelchair, hospital escort: Yes.  Special equipment needed: -Collar    Be sure to schedule a follow-up appointment with your primary care doctor or any specialists as instructed.     Discharge Plan:   Pneumococcal Vaccine Administered/Refused: Not given - Patient refused pneumococcal vaccine  Influenza Vaccine Indication: Patient Refuses    I understand that a diet low in cholesterol, fat, and sodium is recommended for good health. Unless I have been given specific instructions below for another diet, I accept this instruction as my diet prescription.   Other diet: Regular    Special Instructions: None    · Is patient discharged on Warfarin / Coumadin?   No       Call or seek medical attention for questions or concerns   - Follow up with Dr. Patterson  in 2 weeks time - CALL THE OFFICE TO ARRANGE CT ANGIOGRAM - WEAR BRACE AS DIRECTED BY OCCUPATIONAL THERAPY - CONTINUE XARELTO UNTIL DIRECTED BY DR. PATTERSON   - Follow up with Dr. Mireles in 2 weeks time - NO WEIGHT BEARING ON THE HAND, MAY BEAR WEIGHT AT THE ELBOW   - Follow up with primary care provider within one weeks time   - Resume regular diet   - May take over the counter acetaminophen or ibuprofen as needed for pain   - Continue daily over the counter stool softener while on narcotics   - No operation of machinery or motorized vehicles while under the influence of narcotics   - No alcohol use while under the influence of narcotics   - No swimming, hot tubs, baths or wound submersion until cleared by outpatient provider. May shower   - No contact sports, strenuous activities, or heavy lifting until cleared by outpatient provider   - If respiratory decompensation, change in condition or worsening conditon, or signs or symptoms of infection occur seek medical attention        Cervical Spine Fracture, Stable  A cervical spine fracture is a break or crack in one of the bones of the neck.  If there is a very low risk of problems happening during healing, the fracture is considered stable.  What are the causes?  This condition may be caused by:  · Motor vehicle accidents.  · Injuries from sports such as diving, football, biking, wrestling, or skiing.  · Severe osteoporosis or other bone diseases, such as cancers that spread to bone or metabolic abnormalities that cause bone weakness.  What are the signs or symptoms?  Symptoms of this condition include:  · Severe neck pain after an accident or fall. Pain may spread down the shoulders or arms.  · Bruising or swelling on the back of the neck.  · Numbness, tingling, sudden muscle tightening (spasms), or weakness in the arms, legs, or both.  How is this diagnosed?  This condition may be diagnosed based on:  · Your medical history.  · A physical exam of your neck, arms, and legs.  · Imaging studies of the neck, such as:  ¨ X-rays.  ¨ CT scan.  ¨ MRI.  How is this treated?  This condition is treated with a neck brace or cervical collar to keep your neck from moving during the healing process. A cervical collar is a device that supports your chin and the back of your head. You may also be given medicine to help relieve pain.  Follow these instructions at home:  If you have a neck brace:  · Wear the brace as told by your health care provider. Remove it only as told by your health care provider.  · If you experience numbness or tingling, loosen the brace.  · Keep the brace clean.  · If the brace is not waterproof:  ¨ Do not let it get wet.  ¨ Cover it with a watertight covering when you take a bath or a shower.  If you have a cervical collar:  · Do not remove the collar unless your health care provider tells you to do this. If you are allowed to remove the collar for cleaning and bathing:  ¨ Follow your health care provider’s instructions about how to safely take off the collar.  ¨ Wash and thoroughly dry the skin on your neck. Check your skin for irritation or  sores. If you see any, tell your health care provider.  · Ask your health care provider before making any adjustments to your collar. Small adjustments may be needed over time to improve comfort and reduce pressure on your chin or on the back of your head.  · Keep long hair outside of the collar.  · Keep your collar clean by wiping it with mild soap and water and letting it air-dry completely. The pads can be hand-washed with soap and water and air-dried completely.  Managing pain, stiffness, and swelling  · If directed, put ice on the injured area:  ¨ If you have a removable brace or cervical collar, remove it as told by your health care provider.  ¨ Put ice in a plastic bag.  ¨ Place a towel between your skin and the bag.  ¨ Leave the ice on for 20 minutes, 2-3 times a day.  Activity  · Do not drive a car until your health care provider approves.  · Do not drive or use heavy machinery while taking prescription pain medicine.  · Avoid physical activity for as long as directed. Ask your health care provider what activities are safe for you.  General instructions  · Take over-the-counter and prescription medicines only as told by your health care provider.  · Do not take baths, swim, or use a hot tub until your health care provider approves. Ask your health care provider if you can take showers. You may only be allowed to take sponge baths for bathing.  · Do not use any products that contain nicotine or tobacco, such as cigarettes and e-cigarettes. These can delay bone healing. If you need help quitting, ask your health care provider.  · Keep all follow-up visits as told by your health care provider. This is important to help prevent long-term (chronic) or permanent injury, pain, and disability. You may need to have follow-up X-rays or MRI 1-3 weeks after your injury.  Contact a health care provider if:  · You have irritation or sores on your skin from your brace or cervical collar.  Get help right away if:  · You  have neck pain that gets worse.  · You develop difficulties swallowing or breathing.  · You develop swelling in your neck.  · You have any of the following problems in your arms, legs, or both:  ¨ Numbness.  ¨ Weakness.  ¨ Burning pain.  ¨ Movement problems.  · You are unable to control when you urinate or have a bowel movement (incontinence).  · You have problems with coordination or difficulty walking.  This information is not intended to replace advice given to you by your health care provider. Make sure you discuss any questions you have with your health care provider.  Document Released: 11/04/2005 Document Revised: 09/21/2017 Document Reviewed: 09/21/2017  [employer] Interactive Patient Education © 2017 [employer]      Rib Fracture  A rib fracture is a break or crack in one of the bones of the ribs. The ribs are like a cage that goes around your upper chest. A broken or cracked rib is often painful, but most do not cause other problems. Most rib fractures heal on their own in 1-3 months.  Follow these instructions at home:  · Avoid activities that cause pain to the injured area. Protect your injured area.  · Slowly increase activity as told by your doctor.  · Take medicine as told by your doctor.  · Put ice on the injured area for the first 1-2 days after you have been treated or as told by your doctor.  ¨ Put ice in a plastic bag.  ¨ Place a towel between your skin and the bag.  ¨ Leave the ice on for 15-20 minutes at a time, every 2 hours while you are awake.  · Do deep breathing as told by your doctor. You may be told to:  ¨ Take deep breaths many times a day.  ¨ Cough many times a day while hugging a pillow.  ¨ Use a device (incentive spirometer) to perform deep breathing many times a day.  · Drink enough fluids to keep your pee (urine) clear or pale yellow.  · Do not wear a rib belt or binder. These do not allow you to breathe deeply.  Get help right away if:  · You have a fever.  · You have trouble  breathing.  · You cannot stop coughing.  · You cough up thick or bloody spit (mucus).  · You feel sick to your stomach (nauseous), throw up (vomit), or have belly (abdominal) pain.  · Your pain gets worse and medicine does not help.  This information is not intended to replace advice given to you by your health care provider. Make sure you discuss any questions you have with your health care provider.  Document Released: 09/26/2009 Document Revised: 05/25/2017 Document Reviewed: 02/19/2014  [employer] Interactive Patient Education © 2017 [employer]      Radial Fracture  A radial fracture is a break in the radius bone, which is the long bone of the forearm that is on the same side as your thumb. Your forearm is the part of your arm that is between your elbow and your wrist. It is made up of two bones: the radius and the ulna.  Most radial fractures occur near the wrist (distal radial fracture) or near the elbow (radial head fracture). A distal radial fracture is the most common type of broken arm. This fracture usually occurs about an inch above the wrist. Fractures of the middle part of the bone are less common.  What are the causes?  Falling with your arm outstretched is the most common cause of a radial fracture. Other causes include:  · Car accidents.  · Bike accidents.  · A direct blow to the middle part of the radius.  What increases the risk?  · You may be at greater risk for a distal radial fracture if you are 60 years of age or older.  · You may be at greater risk for a radial head fracture if you are:  ¨ Female.  ¨ 30-40 years old.  · You may be at a greater risk for all types of radial fractures if you have a condition that causes your bones to be weak or thin (osteoporosis).  What are the signs or symptoms?  A radial fracture causes pain immediately after the injury. Other signs and symptoms include:  · An abnormal bend or bump in your arm (deformity).  · Swelling.  · Bruising.  · Numbness or  tingling.  · Tenderness.  · Limited movement.  How is this diagnosed?  Your health care provider may diagnose a radial fracture based on:  · Your symptoms.  · Your medical history, including any recent injury.  · A physical exam. Your health care provider will look for any deformity and feel for tenderness over the break. Your health care provider will also check whether the bone is out of place.  · An X-ray exam to confirm the diagnosis and learn more about the type of fracture.  How is this treated?  The goals of treatment are to get the bone in proper position for healing and to keep it from moving so it will heal over time. Your treatment will depend on many factors, especially the type of fracture that you have.  · If the fractured bone:  ¨ Is in the correct position (nondisplaced), you may only need to wear a cast or a splint.  ¨ Has a slightly displaced fracture, you may need to have the bones moved back into place manually (closed reduction) before the splint or cast is put on.  · You may have a temporary splint before you have a plaster cast. The splint allows room for some swelling. After a few days, a cast can replace the splint.  ¨ You may have to wear the cast for about 6 weeks or as directed by your health care provider.  ¨ The cast may be changed after about 3 weeks or as directed by your health care provider.  · After your cast is taken off, you may need physical therapy to regain full movement in your wrist or elbow.  · You may need emergency surgery if you have:  ¨ A fractured bone that is out of position (displaced).  ¨ A fracture with multiple fragments (comminuted fracture).  ¨ A fracture that breaks the skin (open fracture). This type of fracture may require surgical wires, plates, or screws to hold the bone in place.  · You may have X-rays every couple of weeks to check on your healing.  Follow these instructions at home:  · Keep the injured arm above the level of your heart while you are  sitting or lying down. This helps to reduce swelling and pain.  · Apply ice to the injured area:  ¨ Put ice in a plastic bag.  ¨ Place a towel between your skin and the bag.  ¨ Leave the ice on for 20 minutes, 2-3 times per day.  · Move your fingers often to avoid stiffness and to minimize swelling.  · If you have a plaster or fiberglass cast:  ¨ Do not try to scratch the skin under the cast using sharp or pointed objects.  ¨ Check the skin around the cast every day. You may put lotion on any red or sore areas.  ¨ Keep your cast dry and clean.  · If you have a plaster splint:  ¨ Wear the splint as directed.  ¨ Loosen the elastic around the splint if your fingers become numb and tingle, or if they turn cold and blue.  · Do not put pressure on any part of your cast until it is fully hardened. Rest your cast only on a pillow for the first 24 hours.  · Protect your cast or splint while bathing or showering, as directed by your health care provider. Do not put your cast or splint into water.  · Take medicines only as directed by your health care provider.  · Return to activities, such as sports, as directed by your health care provider. Ask your health care provider what activities are safe for you.  · Keep all follow-up visits as directed by your health care provider. This is important.  Contact a health care provider if:  · Your pain medicine is not helping.  · Your cast gets damaged or it breaks.  · Your cast becomes loose.  · Your cast gets wet.  · You have more severe pain or swelling than you did before the cast.  · You have severe pain when stretching your fingers.  · You continue to have pain or stiffness in your elbow or your wrist after your cast is taken off.  Get help right away if:  · You cannot move your fingers.  · You lose feeling in your fingers or your hand.  · Your hand or your fingers turn cold and pale or blue.  · You notice a bad smell coming from your cast.  · You have drainage from underneath your  cast.  · You have new stains from blood or drainage seeping through your cast.  This information is not intended to replace advice given to you by your health care provider. Make sure you discuss any questions you have with your health care provider.  Document Released: 05/31/2007 Document Revised: 05/23/2017 Document Reviewed: 06/12/2015  [employer] Interactive Patient Education © 2017 [employer]      How can pain medicine affect me?  You were prescribed pain medicine. This medicine may:  · Make you tired or sleepy.  · Make you feel dizzy.  · Affect how well you can:  ¨ Drive  ¨ Do certain activities.  Pain medicine may not make all of your pain go away. You should be comfortable enough to:  · Move.  · Breathe.  · Take care of yourself.  How often should I take pain medicine and how much should I take?  · Take pain medicine only as told by your doctor and only as needed for pain.  · You do not need to take pain medicine if you are not having pain, unless your doctor tells you to do that.  · You can take less than the prescribed dose if you find that less medicine helps your pain.  · If you have very bad (severe) pain, call your doctor. Do not take more pills than told by your doctor. Do not take pills more often than told by your doctor.  What should I avoid while I am taking pain medicine?  Follow these instructions after you start taking pain medicine, while you are taking the medicine, and for 8 hours after you stop taking the medicine:  · Do not drive.  · Do not use machinery.  · Do not use power tools.  · Do not sign legal documents.  · Do not drink alcohol.  · Do not take sleeping pills.  · Do not take care of children by yourself.  · Do not do any activities that involve climbing or being in high places.  · Do not go into any body of water unless there is an adult nearby who can watch and help you. This includes:  ¨ Lakes.  ¨ Rivers.  ¨ Oceans.  ¨ Spas.  ¨ Swimming pools.  How can I keep others safe while I  am taking pain medicine?  · Store your pain medicine as told by your doctor. Make sure that you keep it where children and pets cannot reach it.  · Do not share your pain medicine with anyone.  · Do not save any leftover pills. If you have any leftover pain medicine, get rid of it or destroy it as told by your doctor.  What else do I need to know about taking pain medicine?  · Use a poop (stool) softener if you have trouble pooping (constipation) because of your pain medicine. Eating more fruits and vegetables also helps with constipation.  · Write down the times when you take your pain medicine. Look at the times before you take your next dose of medicine.  · Your pain medicine might have acetaminophen in it. Do not take any other acetaminophen while you are taking this medicine. An overdose of acetaminophen can do very bad damage to your liver. If you are taking any medicines in addition to your pain medicine, check the active ingredients on those medicines to see if acetaminophen is listed.  When should I call my doctor?  · Your medicine is not helping the pain.  · You do either of these soon after you take the medicine:  ¨ Throw up (vomit).  ¨ Have watery poop (diarrhea).  · You have new pain in areas that did not hurt before.  · You have an allergic reaction to your medicine. This may include:  ¨ Feeling itchy.  ¨ Swelling.  ¨ Feeling dizzy.  ¨ Getting a new rash.  · You cannot put up with feeling:  ¨ Dizzy.  ¨ Sick to your stomach (nauseous).  When should I call 911 or go to the emergency room?  · You pass out (faint).  · You feel very confused.  · You throw up again and again.  · Your skin or lips turn pale or bluish in color.  · You are:  ¨ Short of breath.  ¨ Breathing much more slowly than usual.  · You have a very bad allergic reaction to your medicine. This includes:  ¨ Developing a swollen tongue.  ¨ Having trouble breathing.  This information is not intended to replace advice given to you by your  health care provider. Make sure you discuss any questions you have with your health care provider.  Document Released: 06/05/2009 Document Revised: 08/24/2017 Document Reviewed: 10/22/2015  © 2017 [employer]    Oxycodone tablets or capsules  What is this medicine?  OXYCODONE (ox i KOE done) is a pain reliever. It is used to treat moderate to severe pain.  This medicine may be used for other purposes; ask your health care provider or pharmacist if you have questions.  COMMON BRAND NAME(S): Dazidox, Endocodone, Oxaydo, OXECTA, OxyIR, Percolone, Roxicodone, ROXYBOND  What should I tell my health care provider before I take this medicine?  They need to know if you have any of these conditions:  -Licking's disease  -brain tumor  -head injury  -heart disease  -history of drug or alcohol abuse problem  -if you often drink alcohol  -kidney disease  -liver disease  -lung or breathing disease, like asthma  -mental illness  -pancreatic disease  -seizures  -thyroid disease  -an unusual or allergic reaction to oxycodone, codeine, hydrocodone, morphine, other medicines, foods, dyes, or preservatives  -pregnant or trying to get pregnant  -breast-feeding  How should I use this medicine?  Take this medicine by mouth with a glass of water. Follow the directions on the prescription label. You can take it with or without food. If it upsets your stomach, take it with food. Take your medicine at regular intervals. Do not take it more often than directed. Do not stop taking except on your doctor's advice.  Some brands of this medicine, like Oxecta, have special instructions. Ask your doctor or pharmacist if these directions are for you: Do not cut, crush or chew this medicine. Swallow only one tablet at a time. Do not wet, soak, or lick the tablet before you take it.  A special MedGuide will be given to you by the pharmacist with each prescription and refill. Be sure to read this information carefully each time.  Talk to your pediatrician  regarding the use of this medicine in children. Special care may be needed.  Overdosage: If you think you have taken too much of this medicine contact a poison control center or emergency room at once.  NOTE: This medicine is only for you. Do not share this medicine with others.  What if I miss a dose?  If you miss a dose, take it as soon as you can. If it is almost time for your next dose, take only that dose. Do not take double or extra doses.  What may interact with this medicine?  This medicine may interact with the following medications:  -alcohol  -antihistamines for allergy, cough and cold  -antiviral medicines for HIV or AIDS  -atropine  -certain antibiotics like clarithromycin, erythromycin, linezolid, rifampin  -certain medicines for anxiety or sleep  -certain medicines for bladder problems like oxybutynin, tolterodine  -certain medicines for depression like amitriptyline, fluoxetine, sertraline  -certain medicines for fungal infections like ketoconazole, itraconazole, voriconazole  -certain medicines for migraine headache like almotriptan, eletriptan, frovatriptan, naratriptan, rizatriptan, sumatriptan, zolmitriptan  -certain medicines for nausea or vomiting like dolasetron, ondansetron, palonosetron  -certain medicines for Parkinson's disease like benztropine, trihexyphenidyl  -certain medicines for seizures like phenobarbital, phenytoin, primidone  -certain medicines for stomach problems like dicyclomine, hyoscyamine  -certain medicines for travel sickness like scopolamine  -diuretics  -general anesthetics like halothane, isoflurane, methoxyflurane, propofol  -ipratropium  -local anesthetics like lidocaine, pramoxine, tetracaine  -MAOIs like Carbex, Eldepryl, Marplan, Nardil, and Parnate  -medicines that relax muscles for surgery  -methylene blue  -nilotinib  -other narcotic medicines for pain or cough  -phenothiazines like chlorpromazine, mesoridazine, prochlorperazine, thioridazine  This list may not  describe all possible interactions. Give your health care provider a list of all the medicines, herbs, non-prescription drugs, or dietary supplements you use. Also tell them if you smoke, drink alcohol, or use illegal drugs. Some items may interact with your medicine.  What should I watch for while using this medicine?  Tell your doctor or health care professional if your pain does not go away, if it gets worse, or if you have new or a different type of pain. You may develop tolerance to the medicine. Tolerance means that you will need a higher dose of the medicine for pain relief. Tolerance is normal and is expected if you take this medicine for a long time.  Do not suddenly stop taking your medicine because you may develop a severe reaction. Your body becomes used to the medicine. This does NOT mean you are addicted. Addiction is a behavior related to getting and using a drug for a non-medical reason. If you have pain, you have a medical reason to take pain medicine. Your doctor will tell you how much medicine to take. If your doctor wants you to stop the medicine, the dose will be slowly lowered over time to avoid any side effects.  There are different types of narcotic medicines (opiates). If you take more than one type at the same time or if you are taking another medicine that also causes drowsiness, you may have more side effects. Give your health care provider a list of all medicines you use. Your doctor will tell you how much medicine to take. Do not take more medicine than directed. Call emergency for help if you have problems breathing or unusual sleepiness.  You may get drowsy or dizzy. Do not drive, use machinery, or do anything that needs mental alertness until you know how the medicine affects you. Do not stand or sit up quickly, especially if you are an older patient. This reduces the risk of dizzy or fainting spells. Alcohol may interfere with the effect of this medicine. Avoid alcoholic  drinks.  This medicine will cause constipation. Try to have a bowel movement at least every 2 to 3 days. If you do not have a bowel movement for 3 days, call your doctor or health care professional.  Your mouth may get dry. Chewing sugarless gum or sucking hard candy, and drinking plenty of water may help. Contact your doctor if the problem does not go away or is severe.  What side effects may I notice from receiving this medicine?  Side effects that you should report to your doctor or health care professional as soon as possible:  -allergic reactions like skin rash, itching or hives, swelling of the face, lips, or tongue  -breathing problems  -confusion  -signs and symptoms of low blood pressure like dizziness; feeling faint or lightheaded, falls; unusually weak or tired  -trouble passing urine or change in the amount of urine  -trouble swallowing  Side effects that usually do not require medical attention (report to your doctor or health care professional if they continue or are bothersome):  -constipation  -dry mouth  -nausea, vomiting  -tiredness  This list may not describe all possible side effects. Call your doctor for medical advice about side effects. You may report side effects to FDA at 2-730-FDA-6350.  Where should I keep my medicine?  Keep out of the reach of children. This medicine can be abused. Keep your medicine in a safe place to protect it from theft. Do not share this medicine with anyone. Selling or giving away this medicine is dangerous and against the law.  Store at room temperature between 15 and 30 degrees C (59 and 86 degrees F). Protect from light. Keep container tightly closed.  This medicine may cause accidental overdose and death if it is taken by other adults, children, or pets. Flush any unused medicine down the toilet to reduce the chance of harm. Do not use the medicine after the expiration date.  NOTE: This sheet is a summary. It may not cover all possible information. If you have  questions about this medicine, talk to your doctor, pharmacist, or health care provider.  © 2018 Elsevier/Gold Standard (2016-11-01 16:55:57)    Rivaroxaban oral tablets  What is this medicine?  RIVAROXABAN (ri va JHONATHAN a ban) is an anticoagulant (blood thinner). It is used to treat blood clots in the lungs or in the veins. It is also used after knee or hip surgeries to prevent blood clots. It is also used to lower the chance of stroke in people with a medical condition called atrial fibrillation.  This medicine may be used for other purposes; ask your health care provider or pharmacist if you have questions.  COMMON BRAND NAME(S): Xarelto, Xarelto Starter Pack  What should I tell my health care provider before I take this medicine?  They need to know if you have any of these conditions:  -bleeding disorders  -bleeding in the brain  -blood in your stools (black or tarry stools) or if you have blood in your vomit  -history of stomach bleeding  -kidney disease  -liver disease  -low blood counts, like low white cell, platelet, or red cell counts  -recent or planned spinal or epidural procedure  -take medicines that treat or prevent blood clots  -an unusual or allergic reaction to rivaroxaban, other medicines, foods, dyes, or preservatives  -pregnant or trying to get pregnant  -breast-feeding  How should I use this medicine?  Take this medicine by mouth with a glass of water. Follow the directions on the prescription label. Take your medicine at regular intervals. Do not take it more often than directed. Do not stop taking except on your doctor's advice. Stopping this medicine may increase your risk of a blood clot. Be sure to refill your prescription before you run out of medicine.  If you are taking this medicine after hip or knee replacement surgery, take it with or without food. If you are taking this medicine for atrial fibrillation, take it with your evening meal. If you are taking this medicine to treat blood  clots, take it with food at the same time each day. If you are unable to swallow your tablet, you may crush the tablet and mix it in applesauce. Then, immediately eat the applesauce. You should eat more food right after you eat the applesauce containing the crushed tablet.  Talk to your pediatrician regarding the use of this medicine in children. Special care may be needed.  Overdosage: If you think you have taken too much of this medicine contact a poison control center or emergency room at once.  NOTE: This medicine is only for you. Do not share this medicine with others.  What if I miss a dose?  If you take your medicine once a day and miss a dose, take the missed dose as soon as you remember. If you take your medicine twice a day and miss a dose, take the missed dose immediately. In this instance, 2 tablets may be taken at the same time. The next day you should take 1 tablet twice a day as directed.  What may interact with this medicine?  Do not take this medicine with any of the following medications:  -defibrotide  This medicine may also interact with the following medications:  -aspirin and aspirin-like medicines  -certain antibiotics like erythromycin, azithromycin, and clarithromycin  -certain medicines for fungal infections like ketoconazole and itraconazole  -certain medicines for irregular heart beat like amiodarone, quinidine, dronedarone  -certain medicines for seizures like carbamazepine, phenytoin  -certain medicines that treat or prevent blood clots like warfarin, enoxaparin, and dalteparin  -conivaptan  -diltiazem  -felodipine  -indinavir  -lopinavir; ritonavir  -NSAIDS, medicines for pain and inflammation, like ibuprofen or naproxen  -ranolazine  -rifampin  -ritonavir  -SNRIs, medicines for depression, like desvenlafaxine, duloxetine, levomilnacipran, venlafaxine  -SSRIs, medicines for depression, like citalopram, escitalopram, fluoxetine, fluvoxamine, paroxetine, sertraline  -Chetan's  wort  -verapamil  This list may not describe all possible interactions. Give your health care provider a list of all the medicines, herbs, non-prescription drugs, or dietary supplements you use. Also tell them if you smoke, drink alcohol, or use illegal drugs. Some items may interact with your medicine.  What should I watch for while using this medicine?  Visit your doctor or health care professional for regular checks on your progress.  Notify your doctor or health care professional and seek emergency treatment if you develop breathing problems; changes in vision; chest pain; severe, sudden headache; pain, swelling, warmth in the leg; trouble speaking; sudden numbness or weakness of the face, arm or leg. These can be signs that your condition has gotten worse.  If you are going to have surgery or other procedure, tell your doctor that you are taking this medicine.  What side effects may I notice from receiving this medicine?  Side effects that you should report to your doctor or health care professional as soon as possible:  -allergic reactions like skin rash, itching or hives, swelling of the face, lips, or tongue  -back pain  -redness, blistering, peeling or loosening of the skin, including inside the mouth  -signs and symptoms of bleeding such as bloody or black, tarry stools; red or dark-brown urine; spitting up blood or brown material that looks like coffee grounds; red spots on the skin; unusual bruising or bleeding from the eye, gums, or nose  Side effects that usually do not require medical attention (report to your doctor or health care professional if they continue or are bothersome):  -dizziness  -muscle pain  This list may not describe all possible side effects. Call your doctor for medical advice about side effects. You may report side effects to FDA at 2-640-FDA-4068.  Where should I keep my medicine?  Keep out of the reach of children.  Store at room temperature between 15 and 30 degrees C (59 and 86  degrees F). Throw away any unused medicine after the expiration date.  NOTE: This sheet is a summary. It may not cover all possible information. If you have questions about this medicine, talk to your doctor, pharmacist, or health care provider.  © 2018 Elsevier/Gold Standard (2017-09-06 16:29:33)        Depression / Suicide Risk    As you are discharged from this [facility] facility, it is important to learn how to keep safe from harming yourself.    Recognize the warning signs:  · Abrupt changes in personality, positive or negative- including increase in energy   · Giving away possessions  · Change in eating patterns- significant weight changes-  positive or negative  · Change in sleeping patterns- unable to sleep or sleeping all the time   · Unwillingness or inability to communicate  · Depression  · Unusual sadness, discouragement and loneliness  · Talk of wanting to die  · Neglect of personal appearance   · Rebelliousness- reckless behavior  · Withdrawal from people/activities they love  · Confusion- inability to concentrate     If you or a loved one observes any of these behaviors or has concerns about self-harm, here's what you can do:  · Talk about it- your feelings and reasons for harming yourself  · Remove any means that you might use to hurt yourself (examples: pills, rope, extension cords, firearm)  · Get professional help from the community (Mental Health, Substance Abuse, psychological counseling)  · Do not be alone:Call your Safe Contact- someone whom you trust who will be there for you.  · Call your local CRISIS HOTLINE 950-6256 or 221-189-8379  · Call your local Children's Mobile Crisis Response Team [place] (182) 407-0042 or www.Flurry  · Call the toll free National Suicide Prevention Hotlines   · National Suicide Prevention Lifeline 367-666-KNZH (0064)  · National Hope Line Network 800-SUICIDE (187-8103)

## 2018-08-22 NOTE — THERAPY
"Pt w/improved fucntional mobility, including bed mobility, transfers, balance, and gait. Pt demonstrated improvedbalane w/SPC vs PFFWW, as he used a SPC at baseline and is well versed in it. Pt w/improved amb endurance and stair climibing, only requring verbal cuing for SPC use on stairs and NWB through L wrist. Pt has met all goals and anticipate pt to be able to return home w/family support and HH services. Will discuss w/primary PT.    Physical Therapy Treatment completed.   Bed Mobility:  Supine to Sit: Supervised  Transfers: Sit to Stand: Modified Independent  Gait: Level Of Assist: Supervised with Single Point Cane         See \"Rehab Therapy-Acute\" Patient Summary Report for complete documentation.       "

## 2018-08-22 NOTE — DISCHARGE PLANNING
Anticipated Discharge Disposition: [facility]    Action: Received HHC orders and DME order for FWW, discussed pt's case with PT and pt does not need FWW for home and has a cane. Discussed pt's case with Trauma APN and pt is ready for d/c when [facility] is able to accept.     Met with pt at bedside and discussed d/c planning and completed assessment. Pt is agreeable to [facility], provided choice form and pt chose [facility]. Pt's PCP is now Dr. Mohan in [place] at [place]. [facility]. Pt has a FWW and cane at home and his wife Andree is available to assist him at home. Completed [facility] IMM with pt at bedside.     Faxed [facility] choice form to [facility] to process referral.     [facility] updated that [facility] has accepted. Updated bedside RN.     Barriers to Discharge: N/A.      Plan: As above, pt has been accepted to [facility] and pt's wife will be picking him up and taking him home.           Care Transition Team Assessment    Information Source  Orientation : Oriented x 4  Information Given By: Patient  Informant's Name: Tony  Who is responsible for making decisions for patient? : Patient    Readmission Evaluation  Is this a readmission?: No    Elopement Risk  Legal Hold: No  Ambulatory or Self Mobile in Wheelchair: Yes  Disoriented: No  Psychiatric Symptoms: None  History of Wandering: No  Elopement this Admit: No  Vocalizing Wanting to Leave: No  Displays Behaviors, Body Language Wanting to Leave: No-Not at Risk for Elopement  Elopement Risk: Not at Risk for Elopement    Interdisciplinary Discharge Planning  Primary Care Physician: Dr. Mohan at [facility] in [place], [place]  Lives with - Patient's Self Care Capacity: Spouse  Patient or legal guardian wants to designate a caregiver (see row info): No  Housing / Facility: [address]  Prior Services: None  Patient Expects to be Discharged to:: home with [facility]  Durable Medical Equipment: Walker, Other - Specify (cane)    Discharge Preparedness  What is your  plan after discharge?: Home health care, Home with help  What are your discharge supports?: Spouse (neighbors)  Prior Functional Level: Ambulatory, Drives Self, Independent with Activities of Daily Living, Independent with Medication Management  Difficulity with ADLs: None  Difficulity with IADLs: Driving  Difficulity with IADL Comments: pt will have wife able to assist with driving while he recovers from hospitalization    Functional Assesment  Prior Functional Level: Ambulatory, Drives Self, Independent with Activities of Daily Living, Independent with Medication Management    Finances  Financial Barriers to Discharge: No  Prescription Coverage: Yes ([employer] or [employer])    Vision / Hearing Impairment  Hearing Impairment: Both Ears                             Anticipated Discharge Information  Anticipated discharge disposition: [facility]  Discharge Address: [address] [address]  Discharge Contact Phone Number: [phone]

## 2018-08-22 NOTE — CARE PLAN
rec'd report from INNA Barnes at 0701 and assumed care of this pt. Pt appears to be sleeping well w/no ss of distress noted at this time. RR E/U. Safety measures in place, call light w/in reach.

## 2018-08-22 NOTE — DISCHARGE SUMMARY
DATE OF ADMISSION:  08/18/2018     DATE OF DISCHARGE:  08/22/2018    LENGTH OF STAY: 4 DAYS     ATTENDING PHYSICIAN:  Norberto Mooney MD     CONSULTING PHYSICIANS:  1.  Hung Pagan MD, neurosurgery.  2.  Steven Mireles MD, orthopedic surgery.  3.  Zakia Cruz MD, vascular surgery.     DISCHARGE DIAGNOSES:  1.  Multiple rib fractures.  2.  Vertebral artery dissection.  3.  Closed fracture of cervical vertebrae.  4.  Radial fracture.  5.  Closed compression fracture of thoracic vertebrae.  6.  Type 2 diabetes, premorbid.  7.  Hypothyroidism, premorbid  8.  Essential hypertension, premorbid.  9.  Benign prostatic hyperplasia, premorbid.    ADDENDUM: Stayed an additional day secondary to PT recommendation for home health. Home when arranged.      No change in DC meds, exam or disposition.

## 2018-08-22 NOTE — DISCHARGE PLANNING
Received Choice form at 1009  Agency/Facility Name: [facility]  Referral sent per Choice form @ [phone]

## 2018-08-22 NOTE — CARE PLAN
rec'd report from Britney RN at 0752 and assumed care of this pt. Pt is awake/A&O x4 and sitting up in the bedside chair w/no ss of distress noted at this time. Pt denies needs/complaints, reports pain as 8/10 but is chronic and his baseline. Britney reports giving pain meds around 0600. Safety measures in place, call light w/in reach.

## 2018-08-22 NOTE — CARE PLAN
Problem: Bowel/Gastric:  Goal: Normal bowel function is maintained or improved  Outcome: PROGRESSING AS EXPECTED      Problem: Mobility  Goal: Risk for activity intolerance will decrease  Outcome: PROGRESSING AS EXPECTED

## 2018-08-23 ENCOUNTER — PATIENT OUTREACH (OUTPATIENT)
Dept: HEALTH INFORMATION MANAGEMENT | Facility: OTHER | Age: 73
End: 2018-08-23

## 2018-11-10 ENCOUNTER — HOSPITAL ENCOUNTER (OUTPATIENT)
Dept: RADIOLOGY | Facility: MEDICAL CENTER | Age: 73
End: 2018-11-10
Attending: NEUROLOGICAL SURGERY
Payer: MEDICARE

## 2018-11-10 DIAGNOSIS — M54.2 CERVICALGIA: ICD-10-CM

## 2018-11-10 DIAGNOSIS — S34.139A: ICD-10-CM

## 2018-11-10 PROCEDURE — 72125 CT NECK SPINE W/O DYE: CPT

## 2018-11-10 PROCEDURE — 72070 X-RAY EXAM THORAC SPINE 2VWS: CPT

## 2019-01-17 ENCOUNTER — HOSPITAL ENCOUNTER (OUTPATIENT)
Dept: LAB | Facility: MEDICAL CENTER | Age: 74
End: 2019-01-17
Attending: NEUROLOGICAL SURGERY
Payer: MEDICARE

## 2019-01-17 LAB
BUN SERPL-MCNC: 21 MG/DL (ref 8–22)
CREAT SERPL-MCNC: 1.29 MG/DL (ref 0.5–1.4)

## 2019-01-17 PROCEDURE — 84520 ASSAY OF UREA NITROGEN: CPT

## 2019-01-17 PROCEDURE — 36415 COLL VENOUS BLD VENIPUNCTURE: CPT

## 2019-01-17 PROCEDURE — 82565 ASSAY OF CREATININE: CPT

## 2019-01-19 ENCOUNTER — HOSPITAL ENCOUNTER (OUTPATIENT)
Dept: RADIOLOGY | Facility: MEDICAL CENTER | Age: 74
End: 2019-01-19
Attending: NEUROLOGICAL SURGERY
Payer: MEDICARE

## 2019-01-19 DIAGNOSIS — S19.89XA OTHER SPECIFIED INJURIES OF OTHER SPECIFIED PART OF NECK, INITIAL ENCOUNTER: ICD-10-CM

## 2019-01-19 DIAGNOSIS — M54.2 CERVICALGIA: ICD-10-CM

## 2019-01-19 PROCEDURE — 72125 CT NECK SPINE W/O DYE: CPT

## 2019-01-19 PROCEDURE — 70498 CT ANGIOGRAPHY NECK: CPT

## 2019-01-19 PROCEDURE — 700117 HCHG RX CONTRAST REV CODE 255: Performed by: NEUROLOGICAL SURGERY

## 2019-01-19 RX ADMIN — IOHEXOL 100 ML: 350 INJECTION, SOLUTION INTRAVENOUS at 16:58

## 2019-03-08 ENCOUNTER — HOSPITAL ENCOUNTER (OUTPATIENT)
Dept: HOSPITAL 8 - CVU | Age: 74
Discharge: HOME | End: 2019-03-08
Attending: INTERNAL MEDICINE
Payer: MEDICARE

## 2019-03-08 DIAGNOSIS — I42.0: ICD-10-CM

## 2019-03-08 DIAGNOSIS — I35.8: Primary | ICD-10-CM

## 2019-03-08 PROCEDURE — 93306 TTE W/DOPPLER COMPLETE: CPT

## 2019-03-16 ENCOUNTER — HOSPITAL ENCOUNTER (OUTPATIENT)
Dept: RADIOLOGY | Facility: MEDICAL CENTER | Age: 74
End: 2019-03-16
Attending: NEUROLOGICAL SURGERY
Payer: MEDICARE

## 2019-03-16 DIAGNOSIS — M54.2 CERVICALGIA: ICD-10-CM

## 2019-03-16 PROCEDURE — 72125 CT NECK SPINE W/O DYE: CPT

## 2019-03-16 PROCEDURE — 72050 X-RAY EXAM NECK SPINE 4/5VWS: CPT

## 2021-05-13 ENCOUNTER — PRE-ADMISSION TESTING (OUTPATIENT)
Dept: ADMISSIONS | Facility: MEDICAL CENTER | Age: 76
End: 2021-05-13
Attending: SURGERY
Payer: MEDICARE

## 2021-05-13 DIAGNOSIS — Z01.812 PRE-OPERATIVE LABORATORY EXAMINATION: ICD-10-CM

## 2021-05-13 PROCEDURE — U0005 INFEC AGEN DETEC AMPLI PROBE: HCPCS

## 2021-05-13 PROCEDURE — U0003 INFECTIOUS AGENT DETECTION BY NUCLEIC ACID (DNA OR RNA); SEVERE ACUTE RESPIRATORY SYNDROME CORONAVIRUS 2 (SARS-COV-2) (CORONAVIRUS DISEASE [COVID-19]), AMPLIFIED PROBE TECHNIQUE, MAKING USE OF HIGH THROUGHPUT TECHNOLOGIES AS DESCRIBED BY CMS-2020-01-R: HCPCS

## 2021-05-13 PROCEDURE — C9803 HOPD COVID-19 SPEC COLLECT: HCPCS

## 2021-05-13 RX ORDER — LEVOTHYROXINE SODIUM 88 UG/1
88 TABLET ORAL EVERY MORNING
COMMUNITY
Start: 2020-12-31

## 2021-05-14 LAB
SARS-COV-2 RNA RESP QL NAA+PROBE: NOTDETECTED
SPECIMEN SOURCE: NORMAL

## 2021-05-19 ENCOUNTER — HOSPITAL ENCOUNTER (OUTPATIENT)
Facility: MEDICAL CENTER | Age: 76
End: 2021-05-19
Attending: SURGERY | Admitting: SURGERY
Payer: MEDICARE

## 2021-05-19 ENCOUNTER — ANESTHESIA (OUTPATIENT)
Dept: SURGERY | Facility: MEDICAL CENTER | Age: 76
End: 2021-05-19
Payer: MEDICARE

## 2021-05-19 ENCOUNTER — ANESTHESIA EVENT (OUTPATIENT)
Dept: SURGERY | Facility: MEDICAL CENTER | Age: 76
End: 2021-05-19
Payer: MEDICARE

## 2021-05-19 VITALS
DIASTOLIC BLOOD PRESSURE: 70 MMHG | WEIGHT: 244.71 LBS | BODY MASS INDEX: 29.8 KG/M2 | SYSTOLIC BLOOD PRESSURE: 136 MMHG | HEART RATE: 62 BPM | TEMPERATURE: 97 F | OXYGEN SATURATION: 92 % | HEIGHT: 76 IN | RESPIRATION RATE: 13 BRPM

## 2021-05-19 DIAGNOSIS — M25.512 CHRONIC LEFT SHOULDER PAIN: ICD-10-CM

## 2021-05-19 DIAGNOSIS — G89.29 CHRONIC LEFT SHOULDER PAIN: ICD-10-CM

## 2021-05-19 LAB
ERYTHROCYTE [DISTWIDTH] IN BLOOD BY AUTOMATED COUNT: 43.1 FL (ref 35.9–50)
HCT VFR BLD AUTO: 41.2 % (ref 42–52)
HGB BLD-MCNC: 13.6 G/DL (ref 14–18)
MCH RBC QN AUTO: 28.3 PG (ref 27–33)
MCHC RBC AUTO-ENTMCNC: 33 G/DL (ref 33.7–35.3)
MCV RBC AUTO: 85.8 FL (ref 81.4–97.8)
PLATELET # BLD AUTO: 242 K/UL (ref 164–446)
PMV BLD AUTO: 12.3 FL (ref 9–12.9)
RBC # BLD AUTO: 4.8 M/UL (ref 4.7–6.1)
WBC # BLD AUTO: 5.7 K/UL (ref 4.8–10.8)

## 2021-05-19 PROCEDURE — 160002 HCHG RECOVERY MINUTES (STAT): Performed by: SURGERY

## 2021-05-19 PROCEDURE — A9270 NON-COVERED ITEM OR SERVICE: HCPCS | Performed by: ANESTHESIOLOGY

## 2021-05-19 PROCEDURE — A9270 NON-COVERED ITEM OR SERVICE: HCPCS | Performed by: SURGERY

## 2021-05-19 PROCEDURE — 700111 HCHG RX REV CODE 636 W/ 250 OVERRIDE (IP): Performed by: SURGERY

## 2021-05-19 PROCEDURE — 160029 HCHG SURGERY MINUTES - 1ST 30 MINS LEVEL 4: Performed by: SURGERY

## 2021-05-19 PROCEDURE — 500878 HCHG PACK, ARTHROSCOPY: Performed by: SURGERY

## 2021-05-19 PROCEDURE — 502578 HCHG PACK, TOTAL HIP: Performed by: SURGERY

## 2021-05-19 PROCEDURE — 700111 HCHG RX REV CODE 636 W/ 250 OVERRIDE (IP): Performed by: ANESTHESIOLOGY

## 2021-05-19 PROCEDURE — 700101 HCHG RX REV CODE 250: Performed by: ANESTHESIOLOGY

## 2021-05-19 PROCEDURE — 700101 HCHG RX REV CODE 250: Performed by: SURGERY

## 2021-05-19 PROCEDURE — 160025 RECOVERY II MINUTES (STATS): Performed by: SURGERY

## 2021-05-19 PROCEDURE — 160047 HCHG PACU  - EA ADDL 30 MINS PHASE II: Performed by: SURGERY

## 2021-05-19 PROCEDURE — 160009 HCHG ANES TIME/MIN: Performed by: SURGERY

## 2021-05-19 PROCEDURE — 85027 COMPLETE CBC AUTOMATED: CPT

## 2021-05-19 PROCEDURE — C1713 ANCHOR/SCREW BN/BN,TIS/BN: HCPCS | Performed by: SURGERY

## 2021-05-19 PROCEDURE — 64415 NJX AA&/STRD BRCH PLXS IMG: CPT | Performed by: SURGERY

## 2021-05-19 PROCEDURE — 501838 HCHG SUTURE GENERAL: Performed by: SURGERY

## 2021-05-19 PROCEDURE — 160041 HCHG SURGERY MINUTES - EA ADDL 1 MIN LEVEL 4: Performed by: SURGERY

## 2021-05-19 PROCEDURE — 700105 HCHG RX REV CODE 258: Performed by: SURGERY

## 2021-05-19 PROCEDURE — 160048 HCHG OR STATISTICAL LEVEL 1-5: Performed by: SURGERY

## 2021-05-19 PROCEDURE — 500367 HCHG DRAIN KIT, HEMOVAC: Performed by: SURGERY

## 2021-05-19 PROCEDURE — 160035 HCHG PACU - 1ST 60 MINS PHASE I: Performed by: SURGERY

## 2021-05-19 PROCEDURE — 160046 HCHG PACU - 1ST 60 MINS PHASE II: Performed by: SURGERY

## 2021-05-19 PROCEDURE — A4565 SLINGS: HCPCS | Performed by: SURGERY

## 2021-05-19 PROCEDURE — 700102 HCHG RX REV CODE 250 W/ 637 OVERRIDE(OP): Performed by: ANESTHESIOLOGY

## 2021-05-19 DEVICE — SUTURE ANCHOR MULTIFIX 5.5 S KNOTLESS: Type: IMPLANTABLE DEVICE | Site: SHOULDER | Status: FUNCTIONAL

## 2021-05-19 DEVICE — TAPE W/ULTRA COBRAID BLUE HEALICOIL 5.5MM: Type: IMPLANTABLE DEVICE | Site: SHOULDER | Status: FUNCTIONAL

## 2021-05-19 DEVICE — TAPE HEALICOIL W/ULTRA BLUE 5.5MM: Type: IMPLANTABLE DEVICE | Site: SHOULDER | Status: FUNCTIONAL

## 2021-05-19 RX ORDER — ONDANSETRON 2 MG/ML
INJECTION INTRAMUSCULAR; INTRAVENOUS PRN
Status: DISCONTINUED | OUTPATIENT
Start: 2021-05-19 | End: 2021-05-19 | Stop reason: SURG

## 2021-05-19 RX ORDER — HYDROMORPHONE HYDROCHLORIDE 1 MG/ML
0.1 INJECTION, SOLUTION INTRAMUSCULAR; INTRAVENOUS; SUBCUTANEOUS
Status: DISCONTINUED | OUTPATIENT
Start: 2021-05-19 | End: 2021-05-19 | Stop reason: HOSPADM

## 2021-05-19 RX ORDER — BUPIVACAINE HYDROCHLORIDE 2.5 MG/ML
INJECTION, SOLUTION EPIDURAL; INFILTRATION; INTRACAUDAL
Status: COMPLETED | OUTPATIENT
Start: 2021-05-19 | End: 2021-05-19

## 2021-05-19 RX ORDER — CELECOXIB 200 MG/1
200 CAPSULE ORAL ONCE
Status: COMPLETED | OUTPATIENT
Start: 2021-05-19 | End: 2021-05-19

## 2021-05-19 RX ORDER — HYDROMORPHONE HYDROCHLORIDE 1 MG/ML
0.4 INJECTION, SOLUTION INTRAMUSCULAR; INTRAVENOUS; SUBCUTANEOUS
Status: DISCONTINUED | OUTPATIENT
Start: 2021-05-19 | End: 2021-05-19 | Stop reason: HOSPADM

## 2021-05-19 RX ORDER — ONDANSETRON 2 MG/ML
4 INJECTION INTRAMUSCULAR; INTRAVENOUS
Status: DISCONTINUED | OUTPATIENT
Start: 2021-05-19 | End: 2021-05-19 | Stop reason: HOSPADM

## 2021-05-19 RX ORDER — OXYCODONE HCL 5 MG/5 ML
10 SOLUTION, ORAL ORAL
Status: DISCONTINUED | OUTPATIENT
Start: 2021-05-19 | End: 2021-05-19 | Stop reason: HOSPADM

## 2021-05-19 RX ORDER — HYDROMORPHONE HYDROCHLORIDE 1 MG/ML
0.2 INJECTION, SOLUTION INTRAMUSCULAR; INTRAVENOUS; SUBCUTANEOUS
Status: DISCONTINUED | OUTPATIENT
Start: 2021-05-19 | End: 2021-05-19 | Stop reason: HOSPADM

## 2021-05-19 RX ORDER — DIPHENHYDRAMINE HYDROCHLORIDE 50 MG/ML
12.5 INJECTION INTRAMUSCULAR; INTRAVENOUS
Status: DISCONTINUED | OUTPATIENT
Start: 2021-05-19 | End: 2021-05-19 | Stop reason: HOSPADM

## 2021-05-19 RX ORDER — MIDAZOLAM HYDROCHLORIDE 1 MG/ML
INJECTION INTRAMUSCULAR; INTRAVENOUS PRN
Status: DISCONTINUED | OUTPATIENT
Start: 2021-05-19 | End: 2021-05-19 | Stop reason: SURG

## 2021-05-19 RX ORDER — OXYCODONE HCL 5 MG/5 ML
5 SOLUTION, ORAL ORAL
Status: DISCONTINUED | OUTPATIENT
Start: 2021-05-19 | End: 2021-05-19 | Stop reason: HOSPADM

## 2021-05-19 RX ORDER — HALOPERIDOL 5 MG/ML
1 INJECTION INTRAMUSCULAR
Status: DISCONTINUED | OUTPATIENT
Start: 2021-05-19 | End: 2021-05-19 | Stop reason: HOSPADM

## 2021-05-19 RX ORDER — SODIUM CHLORIDE, SODIUM LACTATE, POTASSIUM CHLORIDE, CALCIUM CHLORIDE 600; 310; 30; 20 MG/100ML; MG/100ML; MG/100ML; MG/100ML
INJECTION, SOLUTION INTRAVENOUS CONTINUOUS
Status: ACTIVE | OUTPATIENT
Start: 2021-05-19 | End: 2021-05-19

## 2021-05-19 RX ORDER — DEXAMETHASONE SODIUM PHOSPHATE 4 MG/ML
INJECTION, SOLUTION INTRA-ARTICULAR; INTRALESIONAL; INTRAMUSCULAR; INTRAVENOUS; SOFT TISSUE PRN
Status: DISCONTINUED | OUTPATIENT
Start: 2021-05-19 | End: 2021-05-19 | Stop reason: SURG

## 2021-05-19 RX ORDER — DEXAMETHASONE SODIUM PHOSPHATE 4 MG/ML
INJECTION, SOLUTION INTRA-ARTICULAR; INTRALESIONAL; INTRAMUSCULAR; INTRAVENOUS; SOFT TISSUE
Status: COMPLETED | OUTPATIENT
Start: 2021-05-19 | End: 2021-05-19

## 2021-05-19 RX ORDER — EPINEPHRINE 1 MG/ML
INJECTION INTRAMUSCULAR; INTRAVENOUS; SUBCUTANEOUS
Status: DISCONTINUED
Start: 2021-05-19 | End: 2021-05-19 | Stop reason: HOSPADM

## 2021-05-19 RX ORDER — LIDOCAINE HYDROCHLORIDE 20 MG/ML
INJECTION, SOLUTION EPIDURAL; INFILTRATION; INTRACAUDAL; PERINEURAL PRN
Status: DISCONTINUED | OUTPATIENT
Start: 2021-05-19 | End: 2021-05-19 | Stop reason: SURG

## 2021-05-19 RX ORDER — ACETAMINOPHEN 500 MG
1000 TABLET ORAL ONCE
Status: COMPLETED | OUTPATIENT
Start: 2021-05-19 | End: 2021-05-19

## 2021-05-19 RX ORDER — CEFAZOLIN SODIUM 1 G/3ML
INJECTION, POWDER, FOR SOLUTION INTRAMUSCULAR; INTRAVENOUS PRN
Status: DISCONTINUED | OUTPATIENT
Start: 2021-05-19 | End: 2021-05-19 | Stop reason: SURG

## 2021-05-19 RX ADMIN — FENTANYL CITRATE 50 MCG: 50 INJECTION, SOLUTION INTRAMUSCULAR; INTRAVENOUS at 13:44

## 2021-05-19 RX ADMIN — POVIDONE IODINE 15 ML: 100 SOLUTION TOPICAL at 09:52

## 2021-05-19 RX ADMIN — LIDOCAINE HYDROCHLORIDE 100 MG: 20 INJECTION, SOLUTION EPIDURAL; INFILTRATION; INTRACAUDAL at 11:53

## 2021-05-19 RX ADMIN — ONDANSETRON 4 MG: 2 INJECTION INTRAMUSCULAR; INTRAVENOUS at 11:57

## 2021-05-19 RX ADMIN — FENTANYL CITRATE 100 MCG: 50 INJECTION, SOLUTION INTRAMUSCULAR; INTRAVENOUS at 12:43

## 2021-05-19 RX ADMIN — FENTANYL CITRATE 50 MCG: 50 INJECTION, SOLUTION INTRAMUSCULAR; INTRAVENOUS at 13:25

## 2021-05-19 RX ADMIN — DEXAMETHASONE SODIUM PHOSPHATE 4 MG: 4 INJECTION, SOLUTION INTRA-ARTICULAR; INTRALESIONAL; INTRAMUSCULAR; INTRAVENOUS; SOFT TISSUE at 11:30

## 2021-05-19 RX ADMIN — BUPIVACAINE HYDROCHLORIDE 30 ML: 2.5 INJECTION, SOLUTION EPIDURAL; INFILTRATION; INTRACAUDAL; PERINEURAL at 11:30

## 2021-05-19 RX ADMIN — MIDAZOLAM HYDROCHLORIDE 2 MG: 1 INJECTION, SOLUTION INTRAMUSCULAR; INTRAVENOUS at 11:30

## 2021-05-19 RX ADMIN — PROPOFOL 50 MG: 10 INJECTION, EMULSION INTRAVENOUS at 13:32

## 2021-05-19 RX ADMIN — CELECOXIB 200 MG: 200 CAPSULE ORAL at 09:51

## 2021-05-19 RX ADMIN — SODIUM CHLORIDE, POTASSIUM CHLORIDE, SODIUM LACTATE AND CALCIUM CHLORIDE: 600; 310; 30; 20 INJECTION, SOLUTION INTRAVENOUS at 09:52

## 2021-05-19 RX ADMIN — DEXAMETHASONE SODIUM PHOSPHATE 4 MG: 4 INJECTION, SOLUTION INTRA-ARTICULAR; INTRALESIONAL; INTRAMUSCULAR; INTRAVENOUS; SOFT TISSUE at 11:57

## 2021-05-19 RX ADMIN — PROPOFOL 150 MG: 10 INJECTION, EMULSION INTRAVENOUS at 11:53

## 2021-05-19 RX ADMIN — CEFAZOLIN 2 G: 330 INJECTION, POWDER, FOR SOLUTION INTRAMUSCULAR; INTRAVENOUS at 11:57

## 2021-05-19 RX ADMIN — ACETAMINOPHEN 1000 MG: 500 TABLET ORAL at 09:51

## 2021-05-19 RX ADMIN — FENTANYL CITRATE 100 MCG: 50 INJECTION, SOLUTION INTRAMUSCULAR; INTRAVENOUS at 11:53

## 2021-05-19 ASSESSMENT — PAIN DESCRIPTION - PAIN TYPE
TYPE: SURGICAL PAIN

## 2021-05-19 NOTE — OR NURSING
1440 - Pt to PACU 6 from OR.  Bedside report from anesthesiologist and RN.  Attached to monitoring, VSS, breathing is calm and unlabored. Oral airway in place, 4L mask.  Pt cold, applied heater and warm blankets.  Anesthesiologist aware.  Arm inspected, no signs of overt bleeding or swelling.      1515 - Back from lunch, pt awake, no oral airway, room air.  Denies pain or nausea, can move fingers, color and cap refill still WNL.  VSS.    1605 - Pt stable to discharge.  Instructions given, patient and wife verbalize understanding.  IV And armbands removed.  Taken via wheelchair with RN escort to car.  Pt has all belongings with them.

## 2021-05-19 NOTE — DISCHARGE INSTRUCTIONS
ACTIVITY: Rest and take it easy for the first 24 hours.  A responsible adult is recommended to remain with you during that time.  It is normal to feel sleepy.  We encourage you to not do anything that requires balance, judgment or coordination.    MILD FLU-LIKE SYMPTOMS ARE NORMAL. YOU MAY EXPERIENCE GENERALIZED MUSCLE ACHES, THROAT IRRITATION, HEADACHE AND/OR SOME NAUSEA.    FOR 24 HOURS DO NOT:  Drive, operate machinery or run household appliances.  Drink beer or alcoholic beverages.   Make important decisions or sign legal documents.    SPECIAL INSTRUCTIONS: Keep Sling on at all times for 6 weeks except for gentle elbow motion and hygeine. No use of affected arm for 6 weeks. No driving while on narcotic pain medications. Contact auto-insurance carrier for clearance to begin driving again; wait at least 6 weeks.     Call MD or come to ER for any major concerns.    DIET: To avoid nausea, slowly advance diet as tolerated, avoiding spicy or greasy foods for the first day.  Add more substantial food to your diet according to your physician's instructions.  Babies can be fed formula or breast milk as soon as they are hungry.  INCREASE FLUIDS AND FIBER TO AVOID CONSTIPATION.    SURGICAL DRESSING/BATHING: Okay to remove bandage in 4 days and cover incisions with bandaids and get wet.     FOLLOW-UP APPOINTMENT:  A follow-up appointment should be arranged with your doctor; call to schedule.    You should CALL YOUR PHYSICIAN if you develop:  Fever greater than 101 degrees F.  Pain not relieved by medication, or persistent nausea or vomiting.  Excessive bleeding (blood soaking through dressing) or unexpected drainage from the wound.  Extreme redness or swelling around the incision site, drainage of pus or foul smelling drainage.  Inability to urinate or empty your bladder within 8 hours.  Problems with breathing or chest pain.    You should call 911 if you develop problems with breathing or chest pain.  If you are unable  to contact your doctor or surgical center, you should go to the nearest emergency room or urgent care center.    Physician's telephone #: [phone] Dr. Thompson    If any questions arise, call your doctor.  If your doctor is not available, please feel free to call the Surgical Center at [phone]. The Contact Center is open Monday through Friday 7AM to 5PM and may speak to a nurse at [phone], or toll free at [phone].     A registered nurse may call you a few days after your surgery to see how you are doing after your procedure.    MEDICATIONS: Resume taking daily medication.  Take prescribed pain medication with food.  If no medication is prescribed, you may take non-aspirin pain medication if needed.  PAIN MEDICATION CAN BE VERY CONSTIPATING.  Take a stool softener or laxative such as senokot, pericolace, or milk of magnesia if needed.     Last pain medication given: Tylenol and Celebrex given at 10am. Don't take ibuprofen until after 4pm.     If your physician has prescribed pain medication that includes Acetaminophen (Tylenol), do not take additional Acetaminophen (Tylenol) while taking the prescribed medication.    Depression / Suicide Risk    As you are discharged from this [facility] facility, it is important to learn how to keep safe from harming yourself.    Recognize the warning signs:  · Abrupt changes in personality, positive or negative- including increase in energy   · Giving away possessions  · Change in eating patterns- significant weight changes-  positive or negative  · Change in sleeping patterns- unable to sleep or sleeping all the time   · Unwillingness or inability to communicate  · Depression  · Unusual sadness, discouragement and loneliness  · Talk of wanting to die  · Neglect of personal appearance   · Rebelliousness- reckless behavior  · Withdrawal from people/activities they love  · Confusion- inability to concentrate     If you or a loved one observes any of these  behaviors or has concerns about self-harm, here's what you can do:  · Talk about it- your feelings and reasons for harming yourself  · Remove any means that you might use to hurt yourself (examples: pills, rope, extension cords, firearm)  · Get professional help from the community (Mental Health, Substance Abuse, psychological counseling)  · Do not be alone:Call your Safe Contact- someone whom you trust who will be there for you.  · Call your local CRISIS HOTLINE 886-7975 or 875-826-8660  · Call your local Children's Mobile Crisis Response Team [place] (094) 238-7518 or www.Mingleplay  · Call the toll free National Suicide Prevention Hotlines   · National Suicide Prevention Lifeline 642-205-KZYX (5949)  · National Hope Line Network 800-SUICIDE (378-8420)

## 2021-05-19 NOTE — ANESTHESIA PROCEDURE NOTES
Airway    Date/Time: 5/19/2021 11:56 AM  Performed by: Josh Amanda M.D.  Authorized by: Josh Amanda M.D.     Location:  OR  Urgency:  Elective  Indications for Airway Management:  Anesthesia      Spontaneous Ventilation: absent    Sedation Level:  Deep  Preoxygenated: Yes    Patient Position:  Sniffing  Mask Difficulty Assessment:  0 - not attempted  Final Airway Type:  Endotracheal airway  Final Endotracheal Airway:  ETT  Cuffed: Yes    Technique Used for Successful ETT Placement:  Direct laryngoscopy    Insertion Site:  Oral  Blade Type:  Bhumi  Laryngoscope Blade/Videolaryngoscope Blade Size:  4  ETT Size (mm):  7.5  Measured from:  Teeth  ETT to Teeth (cm):  22  Placement Verified by: auscultation and capnometry    Cormack-Lehane Classification:  Grade IIb - view of arytenoids or posterior of glottis only  Number of Attempts at Approach:  1

## 2021-05-19 NOTE — ANESTHESIA TIME REPORT
Anesthesia Start and Stop Event Times     Date Time Event    5/19/2021 1140 Ready for Procedure     1148 Anesthesia Start     1442 Anesthesia Stop        Responsible Staff  05/19/21    Name Role Begin End    Josh Amanda M.D. Anesth 1148 1442        Preop Diagnosis (Free Text):  Pre-op Diagnosis     LEFT SHOULDER ROTATOR CUFF TEAR, BICEPS AND ROTATOR CUFF TENDINOPATHY, AC AND LABRAL DEGENERATIVE CHANGES        Preop Diagnosis (Codes):    Post op Diagnosis  Complete rotator cuff tear of left shoulder      Premium Reason  Non-Premium    Comments:

## 2021-05-19 NOTE — ANESTHESIA POSTPROCEDURE EVALUATION
Patient: Tony Garcia    Procedure Summary     Date: 05/19/21 Room / Location: [facility] ROOM 21 / SUR[facility]    Anesthesia Start: 1148 Anesthesia Stop: 1442    Procedures:       ARTHROSCOPY, SHOULDER, WITH ROTATOR CUFF REPAIR (Left Shoulder)      DECOMPRESSION, SHOULDER, ARTHROSCOPIC - SUBACROMIAL (Left Shoulder)      EXCISION, CLAVICLE, DISTAL (Left Shoulder)      ARTHROSCOPY, SHOULDER, WITH BICEPS TENODESIS - COOPER. (Left Shoulder) Diagnosis: (LEFT SHOULDER ROTATOR CUFF TEAR, BICEPS AND ROTATOR CUFF TENDINOPATHY, AC AND LABRAL DEGENERATIVE CHANGES)    Surgeons: Jarett Thompson M.D. Responsible Provider: oJsh Amanda M.D.    Anesthesia Type: general, peripheral nerve block ASA Status: 3          Final Anesthesia Type: general, peripheral nerve block  Last vitals  BP   Blood Pressure : 136/70    Temp   36.1 °C (97 °F)    Pulse   62   Resp   13    SpO2   92 %      Anesthesia Post Evaluation    Patient location during evaluation: PACU  Patient participation: complete - patient participated  Level of consciousness: awake and alert    Airway patency: patent  Anesthetic complications: no  Cardiovascular status: hemodynamically stable  Respiratory status: acceptable  Hydration status: euvolemic    PONV: none    patient able to participate, but full recovery from regional anesthesia has not occurred and is not expected within the stipulated timeframe for the completion of the evaluation      No complications documented.     Nurse Pain Score: 0 (NPRS)

## 2021-05-19 NOTE — ANESTHESIA PROCEDURE NOTES
Peripheral Block    Date/Time: 5/19/2021 11:30 AM  Performed by: Josh Amanda M.D.  Authorized by: Josh Amanda M.D.     Patient Location:  Pre-op  Start Time:  5/19/2021 11:30 AM  End Time:  5/19/2021 11:30 AM  Reason for Block: at surgeon's request and post-op pain management ONLY    patient identified, IV checked, site marked, risks and benefits discussed, surgical consent, monitors and equipment checked, pre-op evaluation and timeout performed    Patient Position:  Supine  Prep: ChloraPrep    Monitoring:  Heart rate, continuous pulse ox and cardiac monitor  Block Region:  Upper Extremity  Upper Extremity - Block Type:  BRACHIAL PLEXUS block, Supraclavicular approach    Laterality:  Left  Procedures: ultrasound guided  Image captured, interpreted and electronically stored.  Local Infiltration:  Lidocaine  Strength:  1 %  Dose:  3 ml  Block Type:  Single-shot  Needle Length:  100mm  Needle Gauge:  21 G  Needle Localization:  Ultrasound guidance  Injection Assessment:  Negative aspiration for heme, no paresthesia on injection, incremental injection and local visualized surrounding nerve on ultrasound  Evidence of intravascular injection: No     US Guided Supraclavicular Brachial Plexus Block    US transducer placed cephalad and parallel to clavicle in angle to view the subclavian artery with the brachial plexus lateral and superficial to the artery. Needle inserted lateral to the transducer in-plane and advanced with the needle tip visualized continually into the perineural position. After negative aspiration, LA injected without resistance.

## 2021-05-19 NOTE — ANESTHESIA PREPROCEDURE EVALUATION
Relevant Problems   CARDIAC   (positive) Dissection, vertebral artery (HCC)   (positive) Essential hypertension      ENDO   (positive) Hypothyroid   (positive) Type 2 diabetes mellitus (HCC)       Physical Exam    Airway   Mallampati: III  TM distance: >3 FB  Neck ROM: limited       Cardiovascular - normal exam  Rhythm: regular  Rate: normal  (-) murmur     Dental - normal exam           Pulmonary - normal exam  Breath sounds clear to auscultation     Abdominal    Neurological - normal exam                 Anesthesia Plan    ASA 3   ASA physical status 3 criteria: diabetes - poorly controlled and hypertension - poorly controlled    Plan - general and peripheral nerve block     Peripheral nerve block will be post-op pain control  Airway plan will be ETT          Induction: intravenous    Postoperative Plan: Postoperative administration of opioids is intended.    Pertinent diagnostic labs and testing reviewed    Informed Consent:    Anesthetic plan and risks discussed with patient.    Use of blood products discussed with: patient whom consented to blood products.

## 2021-05-20 NOTE — OP REPORT
DATE OF SERVICE:  05/19/2021     SURGEON:  Jarett Thompson MD     ASSISTANT:  None.     PREOPERATIVE DIAGNOSES:  1.  Left shoulder labral tear with biceps instability.  2.  Left shoulder massive rotator cuff tear.  3.  Left shoulder subacromial bursitis and impingement.  4.  Left shoulder acromioclavicular joint osteoarthritis.  5.  Left shoulder glenohumeral joint degenerative changes, mild.     POSTOPERATIVE DIAGNOSES:  1.  Left shoulder labral tear with biceps instability.  2.  Left shoulder massive rotator cuff tear.  3.  Left shoulder subacromial bursitis and impingement.  4.  Left shoulder acromioclavicular joint osteoarthritis.  5.  Left shoulder glenohumeral joint degenerative changes, mild.     PROCEDURES:  1.  Left shoulder arthroscopic biceps tenodesis.  2.  Left shoulder arthroscopic rotator cuff repair.  3.  Left shoulder arthroscopic subacromial decompression acromioplasty.  4.  Left shoulder arthroscopic distal clavicle resection.  5.  Left shoulder arthroscopic glenohumeral joint debridement (extensive,   including chondromalacia of the humeral head, labrum, biceps anchor complex   and synovitis in the rotator interval).     ANESTHESIOLOGIST:  Josh Amanda MD     ANESTHESIA:  General with upper extremity nerve block for pain control.     COMPLICATIONS:  None noted.     DRAINS:  None.     SPECIMENS:  None.     ESTIMATED BLOOD LOSS:  Minimal.     INDICATIONS:  The patient is a 75-year-old gentleman well known to me through   my outpatient clinic for the above-mentioned diagnosis.  He believes and   agrees he failed conservative treatment and is a candidate for the   above-mentioned procedure.  Prior to the procedure, he understood the risks,   benefits and alternatives of surgery.  He understood the risks include, but   not be limited to the risk of infection requiring repeat surgery, bleeding   requiring blood transfusion, nerve, blood vessel, tendon injury requiring   repair, chronic  pain, failure of the tendon to heal or retear requiring   revision surgery or salvage procedure, need for a reverse total shoulder   arthroplasty, dissatisfaction with outcome, acquiring coronavirus and its   complications, DVT, pulmonary embolism, heart attack, stroke and even death.    The patient stated despite these risks, he wished to proceed with surgery.     DESCRIPTION OF PROCEDURE:  The patient was met in the preoperative holding   area.  His left shoulder was initialed as correct operative site.  He had an   opportunity to ask questions, all questions were answered and informed consent   was obtained.  The patient was brought to the operating room, laid supine on   the operating room table.  All bony and dependent prominences were well   padded.  Upper extremity nerve block and general anesthesia were induced   without complication.  Ancef was administered for infection prophylaxis.  He   was placed in a well-padded beach chair position.  The left upper extremity   was prepped and draped in the usual sterile fashion.  Formal timeout was   performed in which all parties agreed upon the correct patient, procedure, and   operative site.  We began the procedure by establishing the posterior portal   of the glenohumeral joint, performed diagnostic arthroscopy.  He had mild   degenerative changes of the inferior aspect of the glenoid and the superior   aspect of the glenoid had good remaining cartilage.  He had about a 1.5x1.5 cm   area of exposed bone and a chondral defect consistent with early   osteoarthritis.  He had circumferential labral tearing and instability of the   biceps anchor complex.  Subscapularis was intact.  He had synovitis in the   rotator interval.  I established the anterior portal and performed a   synovectomy in the rotator interval.  I debrided the labrum to a stable rim.    I noticed the biceps sling was intact.  The biceps tendon was tendinopathic.    I noted he had a very large  complete tear of supraspinatus with significant   retraction at the level of the glenoid as well as the upper half of   infraspinatus.  I then debrided the biceps anchor complex, released the biceps   off the superior aspect of the glenoid with a cuff of labral tissue to   prevent retraction and allow for a Y-shaped (keyhole) biceps tenodesis at the   superior aspect of the humeral head at the level of the intact biceps sling.    I then used the shaver to debride the chondromalacia to a stable rim on the   humeral head.  I then turned my attention to the subacromial space.  He had   extensive amount of bursitis.  I established a lateral portal and performed   arthroscopic bursectomy.  I used electrocautery device to release the frayed   CA ligament off the undersurface of the acromion to reveal a large downsloping   subacromial _____, which was resected with 5.5 mm resector.  He had a loose   body, likely osseous fragment from arthritis or from his acromion.  This was   also excised.  I dissected medially to the AC joint where there was no   remaining cartilage and bone-on-bone arthritis.  Through the anterolateral   portal, I performed arthroscopic distal clavicle resection, excising   approximately 8 mm of the lateral clavicle and 4 mm in the medial acromion.  I   took the arm through range of motion.  There was no further bony impingement.    I then turned my attention to the rotator cuff.  I debrided the greater   tuberosity, supraspinatus, infraspinatus footprint with the shaver to bleeding   bone.  I placed a medial row of two 5.5 mm [employer] MultiFix anchors,   each with a 2 mm FiberTape and #2 FiberWire suture.  To mobilize the rotator   cuff, I placed a 2 mm free tape through the leading edge of the rotator cuff,   pulled traction on it and then used the electrocautery device to release   fibrous adhesions on the superior and inferior aspect of the rotator cuff to   mobilize it.  I was able to then  pull on the traction stitch and have it   pulled over to the greater tuberosity.  It was deemed repairable.  I then   passed all suture tails in parachute fashion through the rotator cuff and   brought them down into a lateral row using suture bridge technique using two   5.5 mm MultiFix anchors, getting excellent compression of supraspinatus and   infraspinatus to their footprint.  I then took the arm through range of   motion.  There was no bunching or gapping of the tendon.  I then copiously   irrigated the subacromial space and lavaged the joint through the rotator   interval and removed all instruments and closed the portals with 3-0 nylon   suture, all covered with Xeroform, 4x4's, ABD, tape and a sling.  The patient   awoke from anesthesia without known complication and was transferred in a   stable condition to PACU where there were no immediate postoperative   complaints.     POSTOPERATIVE PLAN:  The patient will be discharged home per same day   criteria, sedentary use of the hand only and follow up in 10-14 days for   staple removal and wound check.        ______________________________  MD ABIMBOLA Calderon/ISELA/EMIR    DD:  05/19/2021 15:13  DT:  05/19/2021 16:45    Job#:  [id number]

## 2023-02-10 PROBLEM — S83.002A SUBLUXATION OF LEFT PATELLA: Status: ACTIVE | Noted: 2023-02-10

## 2023-02-10 PROBLEM — M25.562 ACUTE PAIN OF LEFT KNEE: Status: ACTIVE | Noted: 2023-02-10

## 2023-03-30 PROBLEM — M17.12 PRIMARY OSTEOARTHRITIS OF LEFT KNEE: Status: ACTIVE | Noted: 2023-03-30

## 2023-05-15 NOTE — THERAPY
"Physical Therapy Evaluation completed.   Bed Mobility:  Supine to Sit: Stand by Assist (VC for log rolling )  Transfers: Sit to Stand: Stand by Assist  Gait: Level Of Assist: Stand by Assist with platform  Front-Wheel Walker       Plan of Care: Will benefit from Physical Therapy 5 times per week  Discharge Recommendations: Equipment: left platform walker attachment/FWW     Pt presents with impaired activity tolerance, gait mechanics and pain s/p multitrauma from jump off 25ft haybale; now non op in  for multiple cervical posterior and lateral fractures as well as thoracic and rib fxs; post ORIF of left distal radius fracture. Pt is most limited by brace care, wife present and demo'd cervical adjustment for night time and tightness readjustment; discussed log rolling for adjustment; pt is actually mobilizing quite well but emphazied the necessary of compliance with  and wife supervision 24/7 until cleared by home PT; pt will have 2 assist for his entry stairs as there is no railing; pt wishes to return home ASAP and appears to demonstrate functional mobility required to return home when medically appropriate to do so; would recommend home health PT; wife at bedside reporting she feels confident in aforementioned assistance. will follow if remains in house.     See \"Rehab Therapy-Acute\" Patient Summary Report for complete documentation.     " 69

## (undated) DEVICE — PROBESUCTION  3.5MM 90IN SERFAS ACCELERATOR

## (undated) DEVICE — Device

## (undated) DEVICE — PROTECTOR ULNA NERVE - (36PR/CA)

## (undated) DEVICE — SUCTION INSTRUMENT YANKAUER BULBOUS TIP W/O VENT (50EA/CA)

## (undated) DEVICE — SUTURE GENERAL

## (undated) DEVICE — SET LEADWIRE 5 LEAD BEDSIDE DISPOSABLE ECG (1SET OF 5/EA)

## (undated) DEVICE — BOVIE BLADE COATED - (50/PK)

## (undated) DEVICE — CANISTER SUCTION 3000ML MECHANICAL FILTER AUTO SHUTOFF MEDI-VAC NONSTERILE LF DISP  (40EA/CA)

## (undated) DEVICE — STOCKINETTE IMPERVIOUS 12X48 - STERILELF (10/CA)"

## (undated) DEVICE — SLING ORTH UNV TIETX VLFM ARM

## (undated) DEVICE — DRESSING ABDOMINAL PAD STERILE 8 X 10" (360EA/CA)"

## (undated) DEVICE — GLOVE BIOGEL INDICATOR SZ 8 SURGICAL PF LTX - (50/BX 4BX/CA)

## (undated) DEVICE — ELECTRODE DUAL RETURN W/ CORD - (50/PK)

## (undated) DEVICE — BLADE SAW 90X25X1.37MM SAGITTAL DUAL CUT

## (undated) DEVICE — ELECTRODE 850 FOAM ADHESIVE - HYDROGEL RADIOTRNSPRNT (50/PK)

## (undated) DEVICE — DRAPE U ORTHOPEDIC - (10/BX)

## (undated) DEVICE — GLOVE BIOGEL PI INDICATOR SZ 8.0 SURGICAL PF LF -(50/BX 4BX/CA)

## (undated) DEVICE — DRAPE SURG STERI-DRAPE 7X11OD - (40EA/CA)

## (undated) DEVICE — SENSOR SPO2 NEO LNCS ADHESIVE (20/BX) SEE USER NOTES

## (undated) DEVICE — DETERGENT RENUZYME PLUS 10 OZ PACKET (50/BX)

## (undated) DEVICE — SET EXTENSION WITH 2 PORTS (48EA/CA) ***PART #2C8610 IS A SUBSTITUTE*****

## (undated) DEVICE — TIP INTPLS HFLO ML ORFC BTRY - (12/CS)  FOR SURGILAV

## (undated) DEVICE — PACK TOTAL HIP - (1/CA)

## (undated) DEVICE — PADDING CAST 6 IN X 4 YDS - SOF-ROLL (6RL/BG 6BG/CA)

## (undated) DEVICE — PADDING CAST 4 IN STERILE - 4 X 4 YDS (24/CA)

## (undated) DEVICE — KIT ANESTHESIA W/CIRCUIT & 3/LT BAG W/FILTER (20EA/CA)

## (undated) DEVICE — HEAD HOLDER JUNIOR/ADULT

## (undated) DEVICE — TUBING CLEARLINK DUO-VENT - C-FLO (48EA/CA)

## (undated) DEVICE — DRESSING XEROFORM 1X8 - (50/BX 4BX/CA)

## (undated) DEVICE — TOWELS CLOTH SURGICAL - (4/PK 20PK/CA)

## (undated) DEVICE — PADDING CAST 6 IN STERILE - 6 X 4 YDS (24/CA)

## (undated) DEVICE — PACK LOWER EXTREMITY - (2/CA)

## (undated) DEVICE — SUTURE 0 ETHIBOND CT-1 - (12/BX) 18 INCH

## (undated) DEVICE — SODIUM CHL IRRIGATION 0.9% 1000ML (12EA/CA)

## (undated) DEVICE — SUTURE 2-0 MONOCRYL PLUS UNDYED CT-1 1 X 36 (36EA/BX)"

## (undated) DEVICE — SPIDER SHOULDER HOLDER (12EA/BX)

## (undated) DEVICE — SUTURE 5 TI-CRON HOS-14 - (36/BX)

## (undated) DEVICE — LACTATED RINGERS INJ 1000 ML - (14EA/CA 60CA/PF)

## (undated) DEVICE — SUTURE 3-0 ETHILON FS-1 - (36/BX) 30 INCH

## (undated) DEVICE — TUBE E-T HI-LO CUFF 7.5MM (10EA/PK)

## (undated) DEVICE — PACK ARTHROSCOPY - (2EA//CA)

## (undated) DEVICE — WATER IRRIG. STER. 1500 ML - (9/CA)

## (undated) DEVICE — MASK ANESTHESIA ADULT  - (100/CA)

## (undated) DEVICE — CHLORAPREP 26 ML APPLICATOR - ORANGE TINT(25/CA)

## (undated) DEVICE — GLOVE BIOGEL SZ 7.5 SURGICAL PF LTX - (50PR/BX 4BX/CA)

## (undated) DEVICE — KIT EVACUATER 3 SPRING PVC LF 1/8 DRAIN SIZE (10EA/CA)"

## (undated) DEVICE — TUBING PUMP WITH CONNECTOR REDEUCE (1EA)

## (undated) DEVICE — SLEEVE VASO CALF MED - (10PR/CA)

## (undated) DEVICE — SPLINT PLASTER 3 IN X 15 IN - (50/BX 12BX/CA)

## (undated) DEVICE — KIT ROOM DECONTAMINATION

## (undated) DEVICE — BANDAGE ELASTIC 4 HONEYCOMB - 4"X5YD LF (20/CA)"

## (undated) DEVICE — NEPTUNE 4 PORT MANIFOLD - (20/PK)

## (undated) DEVICE — DRAPE STRLE REG TOWEL 18X24 - (10/BX 4BX/CA)"

## (undated) DEVICE — DRAPE U SPLIT IMP 54 X 76 - (24/CA)

## (undated) DEVICE — TOWEL STOP TIMEOUT SAFETY FLAG (40EA/CA)

## (undated) DEVICE — DRAPETIBURON SHOULDER W/POUCH - (5EA/CA)

## (undated) DEVICE — GLOVE BIOGEL PI INDICATOR SZ 7.5 SURGICAL PF LF -(50/BX 4BX/CA)

## (undated) DEVICE — DRAPE C-ARM LARGE 41IN X 74 IN - (10/BX 2BX/CA)

## (undated) DEVICE — SPONGE GAUZESTER 4 X 4 4PLY - (128PK/CA)

## (undated) DEVICE — SODIUM CHL. IRRIGATION 0.9% 3000ML (4EA/CA 65CA/PF)

## (undated) DEVICE — DRAPE SURGICAL U 77X120 - (10/CA)

## (undated) DEVICE — GLOVE SZ 7.5 BIOGEL PI MICRO - PF LF (50PR/BX)

## (undated) DEVICE — BANDAGE ELASTIC 6 HONEYCOMB - 6X5YD LF (20/CA)"

## (undated) DEVICE — TAPE CLOTH MEDIPORE 6 INCH - (12RL/CA)

## (undated) DEVICE — STAPLER SKIN DISP - (6/BX 10BX/CA) VISISTAT

## (undated) DEVICE — GLOVE BIOGEL PI ORTHO SZ 7 PF LF (40PR/BX)

## (undated) DEVICE — SUTURE 0 PDS-2 CTX 36 INCH - (24/BX)

## (undated) DEVICE — SUTURE 2-0 VICRYL PLUS CT-1 36 (36PK/BX)"

## (undated) DEVICE — DRAPE 36X28IN RAD CARM BND BG - (25/CA) O

## (undated) DEVICE — PAD LAP STERILE 18 X 18 - (5/PK 40PK/CA)

## (undated) DEVICE — GLOVE BIOGEL SZ 8 SURGICAL PF LTX - (50PR/BX 4BX/CA)

## (undated) DEVICE — GOWN WARMING STANDARD FLEX - (30/CA)

## (undated) DEVICE — SHAVER 5.5 RESECTOR FORMULA (5EA/BX )

## (undated) DEVICE — HANDPIECE 10FT INTPLS SCT PLS IRRIGATION HAND CONTROL SET (6/PK)

## (undated) DEVICE — FOAM FACEHOLDER SPIDER (8EA/BX)

## (undated) DEVICE — TUBE CONNECT SUCTION CLEAR 120 X 1/4" (50EA/CA)"

## (undated) DEVICE — TUBING PATIENT W/CONNECTOR REDEUCE (1EA)

## (undated) DEVICE — GLOVE BIOGEL INDICATOR SZ 7.5 SURGICAL PF LTX - (50PR/BX 4BX/CA)